# Patient Record
Sex: MALE | Race: WHITE | NOT HISPANIC OR LATINO | Employment: OTHER | URBAN - METROPOLITAN AREA
[De-identification: names, ages, dates, MRNs, and addresses within clinical notes are randomized per-mention and may not be internally consistent; named-entity substitution may affect disease eponyms.]

---

## 2017-02-24 ENCOUNTER — DOCUMENTATION ONLY (OUTPATIENT)
Dept: RESEARCH | Facility: HOSPITAL | Age: 47
End: 2017-02-24

## 2017-04-18 ENCOUNTER — TELEPHONE (OUTPATIENT)
Dept: CARDIOTHORACIC SURGERY | Facility: CLINIC | Age: 47
End: 2017-04-18

## 2017-04-18 DIAGNOSIS — C34.91 SQUAMOUS CELL CARCINOMA OF LUNG, RIGHT: Primary | ICD-10-CM

## 2017-04-18 NOTE — TELEPHONE ENCOUNTER
Called Dr. Crisostomo's office [phone], to speak with Devin.  Requested most recent clinic note and any radiological testing results obtained in the last 4 months be faxed to [phone].

## 2017-04-28 ENCOUNTER — TELEPHONE (OUTPATIENT)
Dept: CARDIOTHORACIC SURGERY | Facility: CLINIC | Age: 47
End: 2017-04-28

## 2017-04-28 NOTE — TELEPHONE ENCOUNTER
Called patient to ele[place] his appt with dr kruger patient states he does not want to [place] because the drive is too far and  he will f/u with his doctors close to his home.  He also stats he will get a cct done after chemo with dr. hernandez and we can call her for the resultes.

## 2017-08-23 ENCOUNTER — TELEPHONE (OUTPATIENT)
Dept: CARDIOTHORACIC SURGERY | Facility: CLINIC | Age: 47
End: 2017-08-23

## 2017-08-23 NOTE — TELEPHONE ENCOUNTER
Returned call concerning scheduling appointment with Dr. Harding. Would like Dr. Harding to refer him to a Neurologist in [place] or [place], to address the numbness to hands and right leg,  Informed by pt that some of the neurological symptoms he is experiencing was present before the surgery of 12/13/2016.  Encouraged to keep Neurologist appointments that he has and to continue with scheduled rehabilitation for leg and hand strength.  Also encouraged to contact his primary Dr. Dowell for any additional pain medication needed.  Reviewed recent notes from Oncologist (CT report 8/14/17) with pt and confirmed that no disease is present s/p radiation and chemo treatment.  Verbalized understanding.

## 2017-09-01 ENCOUNTER — TELEPHONE (OUTPATIENT)
Dept: CARDIOTHORACIC SURGERY | Facility: CLINIC | Age: 47
End: 2017-09-01

## 2017-09-01 NOTE — TELEPHONE ENCOUNTER
Received a call on 8/31 from pt concerning continued pain he is experiencing since surgery in December 2016.  Post op pain is managed by pain management in [place], [place].  Also being followed by primary care physician who has recommended physical therapy that he currently is following. Requesting a referral to another pain management physician in [place] or additional pain medications.  Will discuss with staff.

## 2017-11-24 ENCOUNTER — TELEPHONE (OUTPATIENT)
Dept: CARDIOTHORACIC SURGERY | Facility: CLINIC | Age: 47
End: 2017-11-24

## 2017-11-24 NOTE — TELEPHONE ENCOUNTER
Pt called to confirm previous surgery date (12/13/2016) and discharge date 12/22/2016.  Information confirmed.

## 2017-12-21 ENCOUNTER — TELEPHONE (OUTPATIENT)
Dept: CARDIOTHORACIC SURGERY | Facility: CLINIC | Age: 47
End: 2017-12-21

## 2017-12-21 NOTE — TELEPHONE ENCOUNTER
Received call from pt. Requesting assistance with obtaining a hospital bed in preparation for his neurosurgery in March.  Informed that he should obtain that information and request from his primary sureon or neuro-surgeon.  Verbalized understanding.

## 2018-06-13 ENCOUNTER — TELEPHONE (OUTPATIENT)
Dept: NEUROSURGERY | Facility: CLINIC | Age: 48
End: 2018-06-13

## 2018-06-15 ENCOUNTER — TELEPHONE (OUTPATIENT)
Dept: NEUROSURGERY | Facility: CLINIC | Age: 48
End: 2018-06-15

## 2018-06-15 NOTE — TELEPHONE ENCOUNTER
Pt is trying to get an appt to Neurosurgery Dept. I advised the Pt to request for a referral for our dept. for a 2nd opinion . For he is under another surgeon's care which ended in discussing surgery. The patient has no updated imaging as well, the patient stated that he will go to the current provider and request for a ref. on Tuesday 06/12/18 .

## 2018-06-20 ENCOUNTER — TELEPHONE (OUTPATIENT)
Dept: NEUROSURGERY | Facility: CLINIC | Age: 48
End: 2018-06-20

## 2018-07-30 ENCOUNTER — TELEPHONE (OUTPATIENT)
Dept: NEUROSURGERY | Facility: CLINIC | Age: 48
End: 2018-07-30

## 2018-07-30 NOTE — TELEPHONE ENCOUNTER
Attempted to contact pt to make sure he has hard copy of outside MRI to bring to appt on 8/2. No answer, no voicemail.

## 2018-07-31 NOTE — TELEPHONE ENCOUNTER
pt aware of his appt with neurosurgery on 8/2 and v/u that he needs to bring hard copy of his outside imaging

## 2018-08-02 ENCOUNTER — OFFICE VISIT (OUTPATIENT)
Dept: NEUROSURGERY | Facility: CLINIC | Age: 48
End: 2018-08-02
Payer: MEDICAID

## 2018-08-02 ENCOUNTER — TELEPHONE (OUTPATIENT)
Dept: NEUROSURGERY | Facility: CLINIC | Age: 48
End: 2018-08-02

## 2018-08-02 ENCOUNTER — HOSPITAL ENCOUNTER (OUTPATIENT)
Dept: RADIOLOGY | Facility: HOSPITAL | Age: 48
Discharge: HOME | End: 2018-08-02
Attending: PHYSICIAN ASSISTANT
Payer: MEDICAID

## 2018-08-02 VITALS
HEART RATE: 80 BPM | DIASTOLIC BLOOD PRESSURE: 95 MMHG | WEIGHT: 197.75 LBS | SYSTOLIC BLOOD PRESSURE: 133 MMHG | BODY MASS INDEX: 30.97 KG/M2 | TEMPERATURE: 98 F

## 2018-08-02 DIAGNOSIS — M54.50 LUMBAR BACK PAIN: ICD-10-CM

## 2018-08-02 DIAGNOSIS — M48.04 THORACIC SPINAL STENOSIS: ICD-10-CM

## 2018-08-02 DIAGNOSIS — M54.16 LUMBAR RADICULOPATHY: ICD-10-CM

## 2018-08-02 DIAGNOSIS — M48.02 CERVICAL STENOSIS OF SPINE: Primary | ICD-10-CM

## 2018-08-02 DIAGNOSIS — M54.12 CERVICAL RADICULOPATHY: Primary | ICD-10-CM

## 2018-08-02 DIAGNOSIS — M54.2 NECK PAIN: ICD-10-CM

## 2018-08-02 DIAGNOSIS — M54.50 LUMBAR BACK PAIN: Primary | ICD-10-CM

## 2018-08-02 PROCEDURE — 99213 OFFICE O/P EST LOW 20 MIN: CPT | Mod: PBBFAC,25 | Performed by: PHYSICIAN ASSISTANT

## 2018-08-02 PROCEDURE — 72100 X-RAY EXAM L-S SPINE 2/3 VWS: CPT | Mod: TC

## 2018-08-02 PROCEDURE — 99999 PR PBB SHADOW E&M-EST. PATIENT-LVL III: CPT | Mod: PBBFAC,,, | Performed by: PHYSICIAN ASSISTANT

## 2018-08-02 PROCEDURE — 99204 OFFICE O/P NEW MOD 45 MIN: CPT | Mod: S$PBB,,, | Performed by: PHYSICIAN ASSISTANT

## 2018-08-02 PROCEDURE — 72100 X-RAY EXAM L-S SPINE 2/3 VWS: CPT | Mod: 26,,, | Performed by: RADIOLOGY

## 2018-08-02 RX ORDER — HYDROCODONE BITARTRATE AND ACETAMINOPHEN 10; 325 MG/1; MG/1
TABLET ORAL
COMMUNITY
Start: 2018-07-21

## 2018-08-02 NOTE — PROGRESS NOTES
[facility]  Neurosurgery    SUBJECTIVE:     History of Present Illness:  Patient is a 47 y.o. male with PMHx squamous cell lung cancer s/p resection, radiation, & chemo who presents for evaluation of neck & back pain with RLE weakness.  He reports thoracotomy in 2016 and feels his symptoms began around that time.  He is a very poor historian.  Today, he reports BUE paresthesias in a C8 pattern, with the LUE>RUE.  He denies BUE weakness.  He also reports paresthesias from the chest down to the feet.  He reports RLE weakness that has also been since about 2016.  He denies LLE weakness or B/B dysfunction.  He uses a walker to ambulate due to his RLE weakness.  He has had PT in the past.  He has never had HALLIE.    Review of patient's allergies indicates:  No Known Allergies    Past Medical History:   Diagnosis Date    Atrial fibrillation with rapid ventricular response 12/13/2016    Cancer     right lung    History of cancer chemotherapy     Lumbar back pain     Neuralgia     finger tips/agnieszka. legs    Pneumonia 11/2016    SOB (shortness of breath) on exertion      Past Surgical History:   Procedure Laterality Date    [facility]    LUNG LOBECTOMY Right     PORTACATH PLACEMENT Left      Family History   Problem Relation Age of Onset    Anesthesia problems Neg Hx      Social History   Substance Use Topics    Smoking status: Former Smoker     Packs/day: 1.50     Years: 27.00     Types: Cigarettes    Smokeless tobacco: Not on file      Comment: quit 2012    Alcohol use No        Review of Systems:  Constitutional: no fever or chills  Eyes: no visual changes  ENT: no nasal congestion or sore throat  Respiratory: no cough or shortness of breath  Cardiovascular: no chest pain or palpitations  Gastrointestinal: no nausea or vomiting, tolerating diet  Genitourinary: no hematuria or dysuria  Integument/Breast: no rash or pruritis  Hematologic/Lymphatic: no easy bruising or  lymphadenopathy  Musculoskeletal: positive for neck & back pain  Neurological: no seizures or tremors, positive for leg weakness  Behavioral/Psych: no auditory or visual hallucinations  Endocrine: no heat or cold intolerance    OBJECTIVE:     Vital Signs (Most Recent):  Temp: 97.8 °F (36.6 °C) (08/02/18 0947)  Pulse: 80 (08/02/18 0947)  BP: (!) 133/95 (08/02/18 0947)    Physical Exam:  General: well developed, well nourished, no distress  Head: normocephalic, atraumatic  Neurologic: Alert and oriented. Thought content appropriate  GCS: Motor: 6/Verbal: 5/Eyes: 4 GCS Total: 15  Mental Status: Awake, Alert, Oriented x 4  Language: No aphasia  Speech: No dysarthria  Cranial nerves: face symmetric, tongue midline, CN II-XII grossly intact.   Eyes: pupils equal, round, reactive to light with accommodation, EOMI. Unable to appreciate optic disc. Red reflex intact bilaterally.   Pulmonary: normal respirations, not labored, no accessory muscles used  Abdomen: soft, non-distended, not tender to palpation  Sensory: decreased sensation from the chest, T4 level, and down  Motor Strength: Moves all extremities spontaneously with good tone.  Full strength upper and lower extremities. No abnormal movements seen.     Strength  Deltoids Triceps Biceps Wrist Extension Wrist Flexion Hand    Upper: R 5/5 5/5 5/5 5/5 5/5 5/5    L 5/5 5/5 5/5 5/5 5/5 5/5     Iliopsoas Quadriceps Knee  Flexion Tibialis  anterior Gastro- cnemius EHL   Lower: R 4-/5 4-/5 4-/5 3/5 3/5 3/5    L 5/5 5/5 5/5 5/5 5/5 5/5     DTR's - 2 + and symmetric triceps, biceps, brachioradialis, patellar, & achilles  Pronator Drift: no drift noted  Finger-to-nose: Intact bilaterally  Daniel: absent  Clonus: present on left  Babinski: absent  Pulses: 2+ and symmetric radial and dorsalis pedis  Skin: warm, dry and intact, no rashes  Straight leg raise: negative  Gait: antalgic gait Tandem Gait: Difficulty        Unable to walk on heels & toes on right  Cervical ROM:  full  Lumbar ROM: full   SI Joint tenderness: Negative   Pain on Hip ROM: Negative  Skin: intact, no visible rashes or lesions      Diagnostic Results:  I personally reviewed MRI C/Tspine & agree with the findings: Spondylosis of the cervical spine most prominent at C5-6 with severe stenosis & cord indentation with chronic myelomalacia of the cord.  Spondylosis at T3-4 with stenosis, midline indentation, and chronic myelomalacia of the cord.  Postpneumonectomy changes of the right.  No evidence of aggressive osseous lesion.      ASSESSMENT/PLAN:     Patient is a 47 y.o. male with PMHx squamous cell lung cancer s/p resection, radiation, & chemo who presents for evaluation of neck & back pain with RLE weakness, found to have cervical stenosis at C5-6, as well as some thoracic stenosis at T3-4  -Will get MRI Lspine to further evaluate RLE weakness  -Flex/ex of Cspine ordered to r/o instability  -Follow up with Dr. Herrera after above complete  -Discussed with Dr. Herrera    Please feel free to call with any further questions    Nadege Orantes PA-C  Neurosurgery  [phone]

## 2018-09-27 ENCOUNTER — HOSPITAL ENCOUNTER (OUTPATIENT)
Dept: RADIOLOGY | Facility: HOSPITAL | Age: 48
Discharge: HOME | End: 2018-09-27
Attending: PHYSICIAN ASSISTANT
Payer: MEDICAID

## 2018-09-27 ENCOUNTER — OFFICE VISIT (OUTPATIENT)
Dept: NEUROSURGERY | Facility: CLINIC | Age: 48
End: 2018-09-27
Payer: MEDICAID

## 2018-09-27 VITALS
WEIGHT: 197 LBS | BODY MASS INDEX: 30.85 KG/M2 | HEART RATE: 85 BPM | DIASTOLIC BLOOD PRESSURE: 83 MMHG | TEMPERATURE: 98 F | SYSTOLIC BLOOD PRESSURE: 120 MMHG

## 2018-09-27 DIAGNOSIS — G95.9 CERVICAL MYELOPATHY: Primary | ICD-10-CM

## 2018-09-27 DIAGNOSIS — R29.898 RIGHT LEG WEAKNESS: ICD-10-CM

## 2018-09-27 DIAGNOSIS — M54.2 NECK PAIN: ICD-10-CM

## 2018-09-27 DIAGNOSIS — M54.12 CERVICAL RADICULOPATHY: ICD-10-CM

## 2018-09-27 DIAGNOSIS — M54.50 LUMBAR BACK PAIN: ICD-10-CM

## 2018-09-27 DIAGNOSIS — M54.16 LUMBAR RADICULOPATHY: ICD-10-CM

## 2018-09-27 PROCEDURE — 99999 PR PBB SHADOW E&M-EST. PATIENT-LVL III: CPT | Mod: PBBFAC,,, | Performed by: NEUROLOGICAL SURGERY

## 2018-09-27 PROCEDURE — 72040 X-RAY EXAM NECK SPINE 2-3 VW: CPT | Mod: TC

## 2018-09-27 PROCEDURE — 72148 MRI LUMBAR SPINE W/O DYE: CPT | Mod: 26,,, | Performed by: RADIOLOGY

## 2018-09-27 PROCEDURE — 99214 OFFICE O/P EST MOD 30 MIN: CPT | Mod: S$PBB,,, | Performed by: NEUROLOGICAL SURGERY

## 2018-09-27 PROCEDURE — 99213 OFFICE O/P EST LOW 20 MIN: CPT | Mod: PBBFAC,25 | Performed by: NEUROLOGICAL SURGERY

## 2018-09-27 PROCEDURE — 72148 MRI LUMBAR SPINE W/O DYE: CPT | Mod: TC

## 2018-09-27 PROCEDURE — 72040 X-RAY EXAM NECK SPINE 2-3 VW: CPT | Mod: 26,,, | Performed by: RADIOLOGY

## 2018-09-27 RX ORDER — PREDNISONE 10 MG/1
TABLET ORAL
COMMUNITY
Start: 2018-09-10 | End: 2019-01-03

## 2018-09-27 RX ORDER — ALBUTEROL SULFATE 90 UG/1
AEROSOL, METERED RESPIRATORY (INHALATION)
COMMUNITY
Start: 2018-09-10 | End: 2019-01-03

## 2018-09-27 RX ORDER — VALACYCLOVIR HYDROCHLORIDE 1 G/1
TABLET, FILM COATED ORAL
COMMUNITY
Start: 2018-08-20 | End: 2019-01-03

## 2018-09-27 RX ORDER — OMEPRAZOLE 40 MG/1
CAPSULE, DELAYED RELEASE ORAL
COMMUNITY
Start: 2018-09-17

## 2018-09-27 RX ORDER — FLUCONAZOLE 200 MG/1
TABLET ORAL
COMMUNITY
Start: 2018-08-10 | End: 2019-01-03

## 2018-09-27 RX ORDER — GUAIFENESIN 600 MG/1
TABLET, EXTENDED RELEASE ORAL
COMMUNITY
Start: 2018-08-07

## 2018-09-27 RX ORDER — PROMETHAZINE HYDROCHLORIDE AND CODEINE PHOSPHATE 6.25; 1 MG/5ML; MG/5ML
SOLUTION ORAL
COMMUNITY
Start: 2018-09-10 | End: 2019-01-03

## 2018-09-27 RX ORDER — ACETAMINOPHEN AND CODEINE PHOSPHATE 300; 30 MG/1; MG/1
1 TABLET ORAL
Refills: 0 | COMMUNITY
Start: 2018-09-25 | End: 2019-01-03

## 2018-09-27 RX ORDER — AMOXICILLIN 500 MG/1
CAPSULE ORAL
Refills: 0 | COMMUNITY
Start: 2018-09-25 | End: 2019-01-03

## 2018-09-27 NOTE — PROGRESS NOTES
Dear Nadege Orantes PA-C,     Thank you for referring this patient to my clinic. The full details of my evaluation will also be forthcoming to you by letter.    CHIEF COMPLAINT:  Consult for neck and back pain    I, Mahamed Abraham, attest that this documentation has been prepared under the direction and in the presence of Nilesh Herrera MD.    HPI:  Faye Jordan is a 47 y.o.  male with history of squamous cell lung cancer s/p resection and chemotherapy XRT, and atrial fibrillation, who is referred to me by Nadege Orantes PA-C for evaluation of neck and back pain. Pt reports neck and low back pain as well as RLE weakness. He is primarily concerned with his cervical problems. He notes worsening numbness and tingling in the ulnar aspect of the bilateral forearms into his 4th and 5th digits. He notes waking up from his thoracotomy for lung cancer resection with BLE weakness, right worse than left. He has used a walker since this surgery. Pt received an EMG of the lower extremities but it was a while ago. Pt states that he is in remission and receives yearly exams. Pt presents today using a walker.       Review of patient's allergies indicates:  No Known Allergies    Past Medical History:   Diagnosis Date    Atrial fibrillation with rapid ventricular response 12/13/2016    Cancer     right lung    History of cancer chemotherapy     Lumbar back pain     Neuralgia     finger tips/agnieszka. legs    Pneumonia 11/2016    SOB (shortness of breath) on exertion      Past Surgical History:   Procedure Laterality Date    BRONCHOSCOPY-OPERATIVE,FLEXIBLE N/A 12/13/2016    [facility] 2ND FLR    DISSECTION-LYMPH NODE Right 12/13/2016    [facility]    [facility]    LUNG LOBECTOMY Right     PNEUMONECTOMY Right 12/13/2016    [facility]    PORTACATH PLACEMENT Left      THORACOTOMY-POSTEROLATERAL Right 12/13/2016    [facility] at [facility] OR [address] [place] [address]     Family History   Problem Relation Age of Onset    Anesthesia problems Neg Hx      Social History     Tobacco Use    Smoking status: Former Smoker     Packs/day: 1.50     Years: 27.00     Pack years: 40.50     Types: Cigarettes    Smokeless tobacco: Former User    Tobacco comment: quit 2012   Substance Use Topics    Alcohol use: No    Drug use: No        Review of Systems   HENT: Negative.    Eyes: Negative.    Respiratory: Negative.    Cardiovascular: Negative.    Gastrointestinal: Negative.    Endocrine: Negative.    Genitourinary: Negative.    Musculoskeletal: Positive for back pain, gait problem (walker) and neck pain. Myalgias: Low back pain.   Skin: Negative.    Allergic/Immunologic: Negative.    Neurological: Positive for weakness (BLE, R>L) and numbness (RLE; bilateral forearms). Negative for light-headedness and headaches.   Hematological: Negative.    Psychiatric/Behavioral: Negative.        OBJECTIVE:   Vital Signs:  Temp: 97.9 °F (36.6 °C) (09/27/18 1342)  Pulse: 85 (09/27/18 1342)  BP: 120/83 (09/27/18 1342)    Physical Exam:    Vital signs: All nursing notes and vital signs reviewed -- afebrile, vital signs stable.  Constitutional: Patient sitting comfortably in chair. Appears well developed and well nourished.  Skin: Exposed areas are intact without abnormal markings, rashes or other lesions.  HEENT: Normocephalic. Normal conjunctivae.  Cardiovascular: Normal rate and regular rhythm.  Respiratory: Chest wall rises and falls symmetrically, without signs of respiratory distress.  Abdomen: Soft and non-tender.  Extremities: Warm and without edema. Calves supple, non-tender.  Psych/Behavior: Normal affect.    Neurological:    Mental status: Alert and oriented. Conversational and appropriate.       Cranial Nerves: Grossly intact.     Motor:    Upper:  Deltoids Triceps Biceps WE WF     R 5/5  5/5 5/5 5/5 5/5 4+/5    L 5/5 5/5 5/5 5/5 5/5 5/5      Lower:  HF KE KF DF PF EHL    R 4+/5 4+/5 5/5 4/5 4+/5 5/5    L 5/5 5/5 5/5 5/5 5/5 5/5     Hand intrisics: R (4/5); L(4+/5)    Sensory: Intact sensation to light touch in all extremities. Romberg negative.    Reflexes:          DTR: 2+ symmetrically throughout.     Daniel's: Negative.     Babinski's: Negative.     Clonus: Negative.     Gowers': Positive.    Cerebellar: Finger-to-nose and rapid alternating movements normal. Ataxic gait.    Spine:    Posture: Head well aligned over pelvis in front and side views.  No focal or global spinal deformity visible on inspection. Shoulders and hips even. No obvious leg length discrepancy. No scapula winging.    Bending: Full ROM with forward, back and lateral bending. No rib prominence with forward bend.    Cervical:      ROM: Full with flexion, extension, lateral rotation and ear-to-shoulder bend. Pain with ROM in all directions     Midline TTP: Negative.     Spurling's test: Negative.     Lhermitte's: Negative.    Thoracic:     Midline TTP: Positive, upper thoracic    Lumbar:     Midline TTP: Negative     Straight Leg Test: Negative     Crossed Straight Leg Test: Negative     Sciatic notch tenderness: Negative.    Other:     SI joint TTP: Negative.     Greater trochanter TTP: Negative.     Tenderness with external/internal hip rotation: Negative.    Diagnostic Results:  All imaging was independently reviewed by me.    MRI C-spine, dated 11/29/2017:  1. Central disc bulge at C5/6 with moderate/severe central stenosis and T2 signal change    MRI T-spine, dated 11/29/2017:  1. Central disc bulge at T3/4 with mild central stenosis    MRI L-spine, dated 09/27/2018:  1. No significant stenosis     Flex/Ex X-ray C-spine, dated 09/27/2018:  1. No gross instability  2. Radiology note of lucency at C6 superior endplate    Flex/Ex X-ray L-spine, dated 8/2/2018:  1. No instability    ASSESSMENT/PLAN:     Faye Jordan has a  history of lung CA in remission who presents with chronic neck pain, bilateral C8 distribution numbness, and chronic RLE weakness. He has a disc bulge at C5-6 with moderate central stenosis and myelomalacia which could cause RLE weakness only, but would be atypical and thus I am not convinced. There is also not significant C7-T1 stenosis to explain his BUE numbness, and my suspicion is higher for ulnar neuropathy. There was a C6 endplate lucency noted on his cervical xray, and given his cancer history I would like to get an MRI C spine +/- ysabel. I will add EMG of BUE/BLE to assess for ulnar neuropathy and causes of his RLE weakness. Finally, there is a T3 disc bulge but I do not think the stenosis is significant enough to be the source of his leg weakness. He does have some TTP in the T3 region, so I will evaluate this again more fully once the above workup is complete.    The patient understands and agrees with the plan of care. All questions were answered.     1. MRI C-spine W/WO contrast   2. EMG/Nerve conduction study of UE and LE  3. RTC pending #1      I, Dr. Nilesh Herrera personally performed the services described in this documentation. All medical record entries made by the scribe, Mahamed Abraham, were at my direction and in my presence.  I have reviewed the chart and agree that the record reflects my personal performance and is accurate and complete.      Nilesh Herrera M.D.  Department of Neurosurgery  [facility]      .

## 2018-09-27 NOTE — LETTER
September 27, 2018      Nadege Orantes PA-C  [address] [facility] [address]           [facility] 7th Fl  [address]  [facility] [address]  Phone: [phone]          Patient: Faye Jordan   MR Number: [id number]   YOB: 1970   Date of Visit: 9/27/2018       Dear Nadege Orantes:    Thank you for referring Faye Jordan to me for evaluation. Attached you will find relevant portions of my assessment and plan of care.    If you have questions, please do not hesitate to call me. I look forward to following Faye Jordan along with you.    Sincerely,    Nilesh Herrera MD    Enclosure  CC:  No Recipients    If you would like to receive this communication electronically, please contact [email or web address] or [phone] to request more information on [employer] access.    For providers and/or their staff who would like to refer a patient to [facility], please contact us through our one-stop-shop provider referral line, [facility], at [phone].    If you feel you have received this communication in error or would no longer like to receive these types of communications, please e-mail [email or web address]

## 2018-09-28 ENCOUNTER — TELEPHONE (OUTPATIENT)
Dept: NEUROSURGERY | Facility: CLINIC | Age: 48
End: 2018-09-28

## 2018-09-28 NOTE — TELEPHONE ENCOUNTER
Spoke w/pt about F/U and MRI scheduled times. Informed that EMG will contact him and if he does not hear within the week, to contact them.    Pt is satisfied and understood.

## 2018-10-16 ENCOUNTER — HOSPITAL ENCOUNTER (OUTPATIENT)
Dept: RADIOLOGY | Facility: HOSPITAL | Age: 48
Discharge: HOME | End: 2018-10-16
Attending: NEUROLOGICAL SURGERY
Payer: MEDICAID

## 2018-10-16 DIAGNOSIS — G95.9 CERVICAL MYELOPATHY: ICD-10-CM

## 2018-10-16 DIAGNOSIS — R29.898 RIGHT LEG WEAKNESS: ICD-10-CM

## 2018-10-16 PROCEDURE — 72156 MRI NECK SPINE W/O & W/DYE: CPT | Mod: 26,,, | Performed by: RADIOLOGY

## 2018-10-16 PROCEDURE — A9585 GADOBUTROL INJECTION: HCPCS | Performed by: NEUROLOGICAL SURGERY

## 2018-10-16 PROCEDURE — 25500020 PHARM REV CODE 255: Performed by: NEUROLOGICAL SURGERY

## 2018-10-16 PROCEDURE — 72156 MRI NECK SPINE W/O & W/DYE: CPT | Mod: TC

## 2018-10-16 RX ORDER — GADOBUTROL 604.72 MG/ML
10 INJECTION INTRAVENOUS
Status: COMPLETED | OUTPATIENT
Start: 2018-10-16 | End: 2018-10-16

## 2018-10-16 RX ADMIN — GADOBUTROL 10 ML: 604.72 INJECTION INTRAVENOUS at 01:10

## 2018-11-14 ENCOUNTER — TELEPHONE (OUTPATIENT)
Dept: NEUROSURGERY | Facility: CLINIC | Age: 48
End: 2018-11-14

## 2018-11-14 NOTE — TELEPHONE ENCOUNTER
Referral and last clinic [place] EMG order.

## 2018-12-18 ENCOUNTER — PROCEDURE VISIT (OUTPATIENT)
Dept: NEUROLOGY | Facility: CLINIC | Age: 48
End: 2018-12-18
Payer: MEDICAID

## 2018-12-18 DIAGNOSIS — R29.898 RIGHT LEG WEAKNESS: ICD-10-CM

## 2018-12-18 DIAGNOSIS — G95.9 CERVICAL MYELOPATHY: ICD-10-CM

## 2018-12-18 PROCEDURE — 95913 NRV CNDJ TEST 13/> STUDIES: CPT | Mod: PBBFAC | Performed by: PSYCHIATRY & NEUROLOGY

## 2018-12-18 PROCEDURE — 95886 MUSC TEST DONE W/N TEST COMP: CPT | Mod: PBBFAC | Performed by: PSYCHIATRY & NEUROLOGY

## 2018-12-18 PROCEDURE — 95886 MUSC TEST DONE W/N TEST COMP: CPT | Mod: 26,S$PBB,, | Performed by: PSYCHIATRY & NEUROLOGY

## 2018-12-18 PROCEDURE — 95913 NRV CNDJ TEST 13/> STUDIES: CPT | Mod: 26,S$PBB,, | Performed by: PSYCHIATRY & NEUROLOGY

## 2018-12-24 ENCOUNTER — TELEPHONE (OUTPATIENT)
Dept: NEUROSURGERY | Facility: CLINIC | Age: 48
End: 2018-12-24

## 2018-12-24 NOTE — TELEPHONE ENCOUNTER
"Pt called to aired his grievances about the postal system, the rate at which he receives his mail, the fact that a piece of tape was on the back of the envelope, and that he does not want people opening his mail.    Pt had no health related concerns for the call that are related to our department.    Advised pt to contact the postal service and medical records regarding his issues with the mail system.  Pt later stated he felt like he had "a cyst growing in the back of his throat".  Advised pt to schedule an appt with his PCP as we do not address that type of concern in neurosx.   V/U.     ----- Message from Gayla Vann sent at 12/24/2018  9:27 AM CST -----  Contact: Pt. [phone]  Needs Advice    Reason for call: The patient would like to speak to Dr. Herrera regarding his appointment. He only wants to speak to the doctor.  Please contact the patient to discuss further.          Communication Preference:PHONE     Additional Information:        "

## 2019-01-03 ENCOUNTER — OFFICE VISIT (OUTPATIENT)
Dept: NEUROSURGERY | Facility: CLINIC | Age: 49
End: 2019-01-03
Payer: MEDICAID

## 2019-01-03 VITALS
TEMPERATURE: 98 F | BODY MASS INDEX: 30.37 KG/M2 | HEART RATE: 110 BPM | DIASTOLIC BLOOD PRESSURE: 77 MMHG | WEIGHT: 200.38 LBS | HEIGHT: 68 IN | SYSTOLIC BLOOD PRESSURE: 116 MMHG

## 2019-01-03 DIAGNOSIS — R29.898 WEAKNESS OF RIGHT LOWER EXTREMITY: Primary | ICD-10-CM

## 2019-01-03 PROCEDURE — 99213 OFFICE O/P EST LOW 20 MIN: CPT | Mod: PBBFAC | Performed by: NEUROLOGICAL SURGERY

## 2019-01-03 PROCEDURE — 99999 PR PBB SHADOW E&M-EST. PATIENT-LVL III: CPT | Mod: PBBFAC,,, | Performed by: NEUROLOGICAL SURGERY

## 2019-01-03 PROCEDURE — 99214 OFFICE O/P EST MOD 30 MIN: CPT | Mod: S$PBB,,, | Performed by: NEUROLOGICAL SURGERY

## 2019-01-03 PROCEDURE — 99214 PR OFFICE/OUTPT VISIT, EST, LEVL IV, 30-39 MIN: ICD-10-PCS | Mod: S$PBB,,, | Performed by: NEUROLOGICAL SURGERY

## 2019-01-03 PROCEDURE — 99999 PR PBB SHADOW E&M-EST. PATIENT-LVL III: ICD-10-PCS | Mod: PBBFAC,,, | Performed by: NEUROLOGICAL SURGERY

## 2019-01-03 NOTE — PROGRESS NOTES
CHIEF COMPLAINT:  Follow up after imaging    I, Mahamed Abraham, attest that this documentation has been prepared under the direction and in the presence of Nilesh Herrera MD.    HPI:  Faye Jordan is a 48 y.o.  male with history of squamous cell lung cancer s/p resection and chemotherapy XRT, and atrial fibrillation, who presents today for a follow up evaluation after imaging. Pt reports that his RLE pain has not improved. He also notes paraesthesias in his LUE and ulnar aspect of both hands. He describes the sensation as shocking, numb, and tingling. His RLE pain is primarily in his anterior right ankle. Pt's walking has not worsened. He states that he can walk very short distances but nothing far without his walker.  Pt presents today using a walker.      Review of patient's allergies indicates:  No Known Allergies    Past Medical History:   Diagnosis Date    Atrial fibrillation with rapid ventricular response 12/13/2016    Cancer     right lung    History of cancer chemotherapy     Lumbar back pain     Neuralgia     finger tips/agnieszka. legs    Pneumonia 11/2016    SOB (shortness of breath) on exertion      Past Surgical History:   Procedure Laterality Date    BRONCHOSCOPY-OPERATIVE,FLEXIBLE N/A 12/13/2016    Performed by [facility]ing [facility] at [facility] OR 2ND FLR    DISSECTION-LYMPH NODE Right 12/13/2016    Performed [facility] at [facility]    [facility]    LUNG LOBECTOMY Right     PNEUMONECTOMY Right 12/13/2016    [facility]formed [facility] at [facility]    PORTACATH PLACEMENT Left     THORACOTOMY-POSTEROLATERAL Right 12/13/2016    [facility]     Family History   Problem Relation Age of Onset    Anesthesia problems Neg Hx      Social History     Tobacco Use    Smoking status: Former Smoker     Packs/day: 1.50     Years: 27.00     Pack years: 40.50     Types: Cigarettes    Smokeless tobacco:  Former User    Tobacco comment: quit 2012   Substance Use Topics    Alcohol use: No    Drug use: No        Review of Systems   Constitutional: Negative.    HENT: Negative.    Eyes: Negative.    Respiratory: Negative.    Cardiovascular: Negative.    Gastrointestinal: Negative.    Endocrine: Negative.    Genitourinary: Negative.    Musculoskeletal: Positive for myalgias (Right anterior ankle). Negative for back pain, gait problem and neck pain.   Skin: Negative.    Allergic/Immunologic: Negative.    Neurological: Positive for numbness (ulnar aspect of bilateral hands; LUE). Negative for weakness, light-headedness and headaches.   Hematological: Negative.    Psychiatric/Behavioral: Negative.        OBJECTIVE:   Vital Signs:  Temp: 97.6 °F (36.4 °C) (01/03/19 1438)  Pulse: 110 (01/03/19 1438)  BP: 116/77 (01/03/19 1438)    Physical Exam:    Vital signs: All nursing notes and vital signs reviewed -- afebrile, vital signs stable.  Constitutional: Patient sitting comfortably in chair. Appears well developed and well nourished.  Skin: Exposed areas are intact without abnormal markings, rashes or other lesions.  HEENT: Normocephalic. Normal conjunctivae.  Cardiovascular: Normal rate and regular rhythm.  Respiratory: Chest wall rises and falls symmetrically, without signs of respiratory distress.  Abdomen: Soft and non-tender.  Extremities: Warm and without edema. Calves supple, non-tender.  Psych/Behavior: Normal affect.    Neurological:    Mental status: Alert and oriented. Conversational and appropriate.       Cranial Nerves: Grossly intact.     Motor:    Upper:  Deltoids Triceps Biceps WE WF     R 5/5 5/5 5/5 5/5 5/5 4+/5    L 5/5 5/5 5/5 5/5 5/5 5/5      Lower:  HF KE KF DF PF EHL    R 4+/5 4+/5 5/5 4/5 4+/5 5/5    L 5/5 5/5 5/5 5/5 5/5 5/5     Hand intrisics: R (4/5); L(4+/5)    Gowers': Positive.    Sensory: Intact sensation to light touch in all extremities. Romberg negative.    Reflexes:          DTR: 2+  symmetrically throughout.     Daniel's: Negative.     Babinski's: Negative.     Clonus: Negative.    Cerebellar: Finger-to-nose and rapid alternating movements normal. Gait stable, fluid.    Spine:    Posture: Head well aligned over pelvis in front and side views.  No focal or global spinal deformity visible on inspection. Shoulders and hips even. No obvious leg length discrepancy. No scapula winging.    Bending: Full ROM with forward, back and lateral bending. No rib prominence with forward bend.    Cervical:      ROM: Full with flexion, extension, lateral rotation and ear-to-shoulder bend.      Midline TTP: Negative.     Spurling's test: Negative.     Lhermitte's: Negative.    Thoracic:     Midline TTP: Positive, upper thoracic    Lumbar:     Midline TTP: Negative     Straight Leg Test: Negative     Crossed Straight Leg Test: Negative     Sciatic notch tenderness: Negative.    Other:     SI joint TTP: Negative.     Greater trochanter TTP: Negative.     Tenderness with external/internal hip rotation: Negative.    Diagnostic Results:  All imaging was independently reviewed by me.    EMG/Nerve conduction study, dated 12/18/2018:  1. Left ulnar neuropathy  2. Chronic, bilateral C7 or C8 radiculopathies    MRI C-spine, dated 10/16/2018:  1. No detrimental changes from prior  2. C5/6 disc herniation re-demonstrated with myelomalacia with no concern for metastatic disease at C6 lytic lesions seen on X-ray    ASSESSMENT/PLAN:     Faye Jordan is a 49 y/o male with history of lung cancer in remission following up for bilateral upper extremity paraesthesias and RLE weakness. EMG of bilateral upper extremities shows left ulnar neuropathy and bilateral C7/C8 radiculopathies which does not correlate with the MRI C-spine. We will not proceed with any neurosurgical intervention at C5/6 as it is unclear that he would improve from surgery. We will order an MRI Brain W/WO contrast to identify the source of the pt's RLE weakness.  If the MRI Brain does not show evidence of metastatic disease, we will refer him to Neurology and consider sending him to Dr. Solis for left ulnar neuropathy. Pt can follow up in the [facility] clinic when the imaging is complete.     The patient understands and agrees with the plan of care. All questions were answered.     1. MRI Brain W/WO contrast  2. Referral to Neurology  3. RTC in PA clinic pending #1      I, Dr. Nilesh Herrera personally performed the services described in this documentation. All medical record entries made by the scribe, Mahamed Abraham, were at my direction and in my presence.  I have reviewed the chart and agree that the record reflects my personal performance and is accurate and complete.      Nilesh Herrera M.D.  Department of Neurosurgery  [facility]      .

## 2019-01-07 ENCOUNTER — TELEPHONE (OUTPATIENT)
Dept: NEUROSURGERY | Facility: CLINIC | Age: 49
End: 2019-01-07

## 2019-01-07 NOTE — TELEPHONE ENCOUNTER
Pt has requested a sitting up/standing MRI bc he has a chest cold and a lot of mucus. Informed pt that a lay down MRI will be required in order to acquire the best quality imaging so we can direct him to the appropriate dept for his continued care.  Advised pt to take a cough suppressant prior to his MRI.  V/U.    ----- Message from Quita Wilkerson sent at 1/7/2019  9:57 AM CST -----  Contact: pt @ [phone]  Asking to speak someone in Dr. Herrera' office,regarding his appt. Please call.

## 2019-01-14 ENCOUNTER — TELEPHONE (OUTPATIENT)
Dept: NEUROSURGERY | Facility: CLINIC | Age: 49
End: 2019-01-14

## 2019-01-14 NOTE — TELEPHONE ENCOUNTER
Explained to pt that it was outlined in his PoC that he agreed upon w/Dr. Herrera at his last visit.  V/U. ----- Message from Lit Carlisle sent at 1/14/2019 10:35 AM CST -----  Contact: pateint  Please call pt at [phone]  Patient is confused about why he needs to see a neurologist.     Thank you

## 2019-01-15 ENCOUNTER — TELEPHONE (OUTPATIENT)
Dept: NEUROSURGERY | Facility: CLINIC | Age: 49
End: 2019-01-15

## 2019-01-15 NOTE — TELEPHONE ENCOUNTER
Explained to pt that he needs the MRI done so the neurologist can review it with him.  Told pt he should expect to hear Dr. Fisher's office to schedule his upcoming appt. as I have already sent them a message in Epic to reach out to him.  V/U.  ----- Message from Marcie Stubbs sent at 1/15/2019  1:55 PM CST -----  Contact: self @ [phone]  Pt is calling to see why he has not been schedule a f/u appt for his MRI results.  Pls call.

## 2019-01-18 ENCOUNTER — HOSPITAL ENCOUNTER (OUTPATIENT)
Dept: RADIOLOGY | Facility: HOSPITAL | Age: 49
Discharge: HOME | End: 2019-01-18
Attending: STUDENT IN AN ORGANIZED HEALTH CARE EDUCATION/TRAINING PROGRAM
Payer: MEDICAID

## 2019-01-18 DIAGNOSIS — R29.898 WEAKNESS OF RIGHT LOWER EXTREMITY: ICD-10-CM

## 2019-01-18 PROCEDURE — 70553 MRI BRAIN STEM W/O & W/DYE: CPT | Mod: 26,,, | Performed by: RADIOLOGY

## 2019-01-18 PROCEDURE — 70553 MRI BRAIN W WO CONTRAST: ICD-10-PCS | Mod: 26,,, | Performed by: RADIOLOGY

## 2019-01-18 PROCEDURE — 25500020 PHARM REV CODE 255: Performed by: STUDENT IN AN ORGANIZED HEALTH CARE EDUCATION/TRAINING PROGRAM

## 2019-01-18 PROCEDURE — 70553 MRI BRAIN STEM W/O & W/DYE: CPT | Mod: TC

## 2019-01-18 PROCEDURE — A9585 GADOBUTROL INJECTION: HCPCS | Performed by: STUDENT IN AN ORGANIZED HEALTH CARE EDUCATION/TRAINING PROGRAM

## 2019-01-18 RX ORDER — GADOBUTROL 604.72 MG/ML
10 INJECTION INTRAVENOUS
Status: COMPLETED | OUTPATIENT
Start: 2019-01-18 | End: 2019-01-18

## 2019-01-18 RX ADMIN — GADOBUTROL 10 ML: 604.72 INJECTION INTRAVENOUS at 01:01

## 2019-01-29 ENCOUNTER — OFFICE VISIT (OUTPATIENT)
Dept: NEUROLOGY | Facility: CLINIC | Age: 49
End: 2019-01-29
Payer: MEDICAID

## 2019-01-29 VITALS
SYSTOLIC BLOOD PRESSURE: 117 MMHG | HEART RATE: 102 BPM | WEIGHT: 198.19 LBS | DIASTOLIC BLOOD PRESSURE: 85 MMHG | HEIGHT: 68 IN | BODY MASS INDEX: 30.04 KG/M2

## 2019-01-29 DIAGNOSIS — G95.9 MYELOPATHY: ICD-10-CM

## 2019-01-29 PROCEDURE — 99999 PR PBB SHADOW E&M-EST. PATIENT-LVL III: CPT | Mod: PBBFAC,,, | Performed by: PSYCHIATRY & NEUROLOGY

## 2019-01-29 PROCEDURE — 99205 PR OFFICE/OUTPT VISIT, NEW, LEVL V, 60-74 MIN: ICD-10-PCS | Mod: S$PBB,,, | Performed by: PSYCHIATRY & NEUROLOGY

## 2019-01-29 PROCEDURE — 99213 OFFICE O/P EST LOW 20 MIN: CPT | Mod: PBBFAC | Performed by: PSYCHIATRY & NEUROLOGY

## 2019-01-29 PROCEDURE — 99205 OFFICE O/P NEW HI 60 MIN: CPT | Mod: S$PBB,,, | Performed by: PSYCHIATRY & NEUROLOGY

## 2019-01-29 PROCEDURE — 99999 PR PBB SHADOW E&M-EST. PATIENT-LVL III: ICD-10-PCS | Mod: PBBFAC,,, | Performed by: PSYCHIATRY & NEUROLOGY

## 2019-01-29 RX ORDER — GABAPENTIN 600 MG/1
600 TABLET ORAL 3 TIMES DAILY PRN
Qty: 270 TABLET | Refills: 1 | Status: SHIPPED | OUTPATIENT
Start: 2019-01-29 | End: 2019-01-29 | Stop reason: SDUPTHER

## 2019-01-29 RX ORDER — GABAPENTIN 600 MG/1
600 TABLET ORAL 3 TIMES DAILY PRN
Qty: 270 TABLET | Refills: 1 | Status: SHIPPED | OUTPATIENT
Start: 2019-01-29 | End: 2019-05-04

## 2019-01-29 NOTE — LETTER
January 29, 2019      Nilesh Herrera MD  [address]  [facility] [address]           [facility] - Neurology  [address]  [facility] [address]  Phone: [phone]  Fax: [phone]          Patient: Faye Jordan   MR Number: [id number]   YOB: 1970   Date of Visit: 1/29/2019       Dear Dr. Nilesh Herrera:    Thank you for referring Faye Jordan to me for evaluation. Attached you will find relevant portions of my assessment and plan of care.    If you have questions, please do not hesitate to call me. I look forward to following Faye Jordan along with you.    Sincerely,    Bobby Landeros MD    Enclosure  CC:  No Recipients    If you would like to receive this communication electronically, please contact [email or web address] or [phone] to request more information on [employer] Link access.    For providers and/or their staff who would like to refer a patient to [facility], please contact us through our one-stop-shop provider referral line, [facility], at [phone].    If you feel you have received this communication in error or would no longer like to receive these types of communications, please e-mail [email or web address]

## 2019-01-29 NOTE — Clinical Note
Kimberley, Could you schedule this patient an ASAP clinic appointment with Dr. Herrera?  I briefly discussed the patient with him earlier this week.CV

## 2019-01-30 NOTE — PROGRESS NOTES
[facility] - NEUROLOGY  [facility] [place]    Date: January 30, 2019   Patient Name: Faye Jordan   MRN: [id number]   PCP: Alexis Dowell  Referring Provider: Nilesh Herrera MD    Assessment:      This is Faye Jordan, 48 y.o. male with significant myelopathic signs, C-spine MRI showing cord signal change, and EMG without evidence of ulnar entrapment to explain hand weakness and with changes in activation pattern consistent with central process.     Plan:      -  Follow up appointment arranged with neurosurgery  -  GBP 600mg bid       I discussed side effects of the medications. I asked the patient to stop the medication if he notices serious adverse effects as we discussed and to seek immediate medical attention at an ER.     Bobby Landeros MD  [facility]   Department of Neurology    Subjective:      HPI:   Mr. Faye Jordan is a 48 y.o. male who presents with a chief complaint of difficulty walking    Patient first noted paresthesias throughout his body but primarily L>R arm as well as back and neck pain prior to surgery for lobectomy in 2016 and started using walker shortly after.  Difficulties with gait and weakness in hands have progressed slowly since that time.     PAST MEDICAL HISTORY:  Past Medical History:   Diagnosis Date    Atrial fibrillation with rapid ventricular response 12/13/2016    Cancer     right lung    History of cancer chemotherapy     Lumbar back pain     Neuralgia     finger tips/agnieszka. legs    Pneumonia 11/2016    SOB (shortness of breath) on exertion        PAST SURGICAL HISTORY:  Past Surgical History:   Procedure Laterality Date    BRONCHOSCOPY-OPERATIVE,FLEXIBLE N/A 12/13/2016    [facility] 2ND [facility]LR    DISSECTION-LYMPH NODE Right 12/13/2016    [facility]    [facility]    LUNG LOBECTOMY Right     PNEUMONECTOMY Right 12/13/2016  "   [facility]    PORTACATH PLACEMENT Left     THORACOTOMY-POSTEROLATERAL Right 12/13/2016    [facility] OR [address] [place] [address]       CURRENT MEDS:  Current Outpatient Medications   Medication Sig Dispense Refill    gabapentin (NEURONTIN) 600 MG tablet Take 1 tablet (600 mg total) by mouth 3 (three) times daily as needed. 270 tablet 1    HYDROcodone-acetaminophen (NORCO)  mg per tablet       MUCINEX 600 mg 12 hr tablet       omeprazole (PRILOSEC) 40 MG capsule        No current facility-administered medications for this visit.        ALLERGIES:  Review of patient's allergies indicates:  No Known Allergies    FAMILY HISTORY:  Family History   Problem Relation Age of Onset    Anesthesia problems Neg Hx        SOCIAL HISTORY:  Social History     Tobacco Use    Smoking status: Former Smoker     Packs/day: 1.50     Years: 27.00     Pack years: 40.50     Types: Cigarettes    Smokeless tobacco: Former User    Tobacco comment: quit 2012   Substance Use Topics    Alcohol use: No    Drug use: No       Review of Systems:  12 review of systems is negative except for the symptoms mentioned in HPI.        Objective:     Vitals:    01/29/19 1450   BP: 117/85   Pulse: 102   Weight: 89.9 kg (198 lb 3.1 oz)   Height: 5' 8" (1.727 m)       General: NAD, well nourished   Eyes: no tearing, discharge, no erythema   ENT: moist mucous membranes of the oral cavity, nares patent    Neck: Supple, full range of motion  Cardiovascular: Warm and well perfused, pulses equal and symmetrical  Lungs: Normal work of breathing, normal chest wall excursions  Skin: No rash, lesions, or breakdown on exposed skin  Psychiatry: Mood and affect are appropriate   Abdomen: soft, non tender, non distended  Extremeties: No cyanosis, clubbing or edema.    Neurological   MENTAL STATUS: Alert and oriented to person, place, and time. Attention and concentration within normal limits. " Speech without dysarthria, able to name and repeat without difficulty. Recent and remote memory within normal limits   CRANIAL NERVES: Visual fields intact. PERRL. EOMI. Facial sensation intact. Face symmetrical. Hearing grossly intact. Full shoulder shrug bilaterally. Tongue protrudes midline   SENSORY: Sensation is intact to light touch throughout.    MOTOR: Increased tone in lower extremities.  Arm abduction 4+/5 and hand intrinsics  4/5, bilateral hip flexion and dorsiflexion 4+/5  REFLEXES: 2+ RUE, 1+ LUE, 3+ BLE.   CEREBELLAR/COORDINATION/GAIT: Gait severely spastic and unsteady, requires walker. Finger to nose intact.

## 2019-01-31 ENCOUNTER — TELEPHONE (OUTPATIENT)
Dept: NEUROSURGERY | Facility: CLINIC | Age: 49
End: 2019-01-31

## 2019-02-04 ENCOUNTER — TELEPHONE (OUTPATIENT)
Dept: SPINE | Facility: CLINIC | Age: 49
End: 2019-02-04

## 2019-02-13 NOTE — PROGRESS NOTES
[facility] - Neurosurgery 7th Fl  Neurosurgery  Established Patient    Patient Name: Faye Jordan  MRN: [id number]  Primary Care Provider: Alexis Dowell DO    Subjective:     Chief Complaint/Reason for Admission: FU after EMG    History of Present Illness:   Mr. Faye Jordan is a 48 year old male with PMHx of squamous cell lung cancer s/p resection on 12/13/16, chemotherapy, and XRT, and atrial fibrillation, who was recently seen in clinic by Dr. Herrera on 1/3/19. At that time, he had complaints of RLE pain, LUE paresthesias, weakness, and difficulty walking. MRI of the cervical spine and EMG of the BUE did not explain his weakness so an MRI of the brain was ordered. Patient presents to clinic today to discuss results. He continues to report progressive weakness and increased stiffness/muscle cramps, LE > UE. He is unable to ambulate without assistive devices. He feels unsteady on his feet and his legs feel as if they are going to give out. He has been compliant with the Neurontin which has slightly improved his RLE pain. He continues to report severe BUE pain and paresthesias along the ulnar distribution making it difficult for him to use his hands. These symptoms have not improved with the Neurontin. Denies any bladder/bowel incontinence or symptoms of urinary retention.          (Not in a hospital admission)    Review of patient's allergies indicates:  No Known Allergies    Past Medical History:   Diagnosis Date    Atrial fibrillation with rapid ventricular response 12/13/2016    Cancer     right lung    History of cancer chemotherapy     Lumbar back pain     Neuralgia     finger tips/agnieszka. legs    Pneumonia 11/2016    SOB (shortness of breath) on exertion      Past Surgical History:   Procedure Laterality Date    BRONCHOSCOPY-OPERATIVE,FLEXIBLE N/A 12/13/2016    [facility]    DISSECTION-LYMPH NODE Right 12/13/2016    [facility]  [facility]    [facility]    LUNG LOBECTOMY Right     PNEUMONECTOMY Right 12/13/2016    [facility]    PORTACATH PLACEMENT Left     THORACOTOMY-POSTEROLATERAL Right 12/13/2016    [facility] 2ND [facility]     Family History     None        Tobacco Use    Smoking status: Former Smoker     Packs/day: 1.50     Years: 27.00     Pack years: 40.50     Types: Cigarettes    Smokeless tobacco: Former User    Tobacco comment: quit 2012   Substance and Sexual Activity    Alcohol use: No    Drug use: No    Sexual activity: No     Review of Systems  Objective:       Neurosurgery Physical Exam  General: well developed, well nourished, no distress.   Head: normocephalic, atraumatic  Neurologic: Alert and oriented. Thought content appropriate.  GCS: Motor: 6/Verbal: 5/Eyes: 4 GCS Total: 15  Mental Status: Awake, Alert, Oriented x 4  Language: No aphasia  Speech: No dysarthria  Cranial nerves: face symmetric, tongue midline, CN II-XII grossly intact.   Eyes: pupils equal, round, reactive to light with accomodation, EOMI.   Pulmonary: normal respirations, no signs of respiratory distress  Abdomen: soft, non-distended, not tender to palpation  Sensory: decreased to light touch throughout ulnar distribution bilaterally    Motor: Exam somewhat pain limited  Hand intrisics: R (4/5); L(4+/5)     Upper:   Deltoids Triceps Biceps WE WF      R 5/5 5/5 5/5 5-/5 4+/5 4+/5     L 5/5 5/5 5/5 5/5 5/5 5/5      Lower:   HF KE KF DF PF EHL     R 4-/5 4-/5 4+/5 4/5 4+/5 5/5     L 5/5 5/5 5/5 5/5 5/5 5/5        Daniel: absent  Clonus: sustained clonus bilaterally        Significant Diagnostics:  MRI brain 1/18/19:  I independently reviewed the imaging.     Unremarkable MRI brain as detailed above specifically without evidence for acute infarction or enhancing lesion.         EMG/Nerve conduction study 12/18/18:  1. Left ulnar  neuropathy  2. Chronic, bilateral C7 or C8 radiculopathies         MRI C-spine 10/16/18:  1. No detrimental changes from prior  2. C5/6 disc herniation re-demonstrated with myelomalacia with no concern for metastatic disease at C6 lytic lesions seen on X-ray      Assessment/Plan:     Assessment:  Faye Jordan is a 47 y/o male with history of lung cancer in remission following up for bilateral upper extremity paraesthesias and RLE weakness. EMG of bilateral upper extremities shows left ulnar neuropathy and bilateral C7/C8 radiculopathies.    Plan:  -Patient neurologically stable on exam. Possible progression of weakness vs pain limited exam.   -MRI brain is negative for any metastatic disease, hydrocephalus, hemorrhage, infarct, or other findings that could explain his progressive weakness.  -At this time, no neurosurgical intervention at C5/6 is recommended because it is unclear if he would improve from surgery.  -Referral to Neurology for continued work up of persistent weakness  -Referral to Dr. Solis to discuss treatment options for left ulnar neuropathy  -Return to spine clinic PRN new or worsening symptoms  -Encouraged patient to call the clinic with any questions, concerns, or exam changes prior to follow up appt.       JESS Chu  Neurosurgery  [facility] - Neurosurgery 7th Fl

## 2019-02-14 ENCOUNTER — OFFICE VISIT (OUTPATIENT)
Dept: NEUROSURGERY | Facility: CLINIC | Age: 49
End: 2019-02-14
Payer: MEDICAID

## 2019-02-14 DIAGNOSIS — M50.10 CERVICAL DISC DISORDER WITH RADICULOPATHY: ICD-10-CM

## 2019-02-14 DIAGNOSIS — C34.91 SQUAMOUS CELL CARCINOMA OF RIGHT LUNG: Primary | Chronic | ICD-10-CM

## 2019-02-14 DIAGNOSIS — G89.4 CHRONIC PAIN SYNDROME: ICD-10-CM

## 2019-02-14 DIAGNOSIS — G56.22 ULNAR NEUROPATHY OF LEFT UPPER EXTREMITY: ICD-10-CM

## 2019-02-14 DIAGNOSIS — R53.1 PROGRESSIVE FOCAL MOTOR WEAKNESS: ICD-10-CM

## 2019-02-14 PROCEDURE — 99213 OFFICE O/P EST LOW 20 MIN: CPT | Mod: S$PBB,,, | Performed by: PHYSICIAN ASSISTANT

## 2019-02-14 PROCEDURE — 99999 PR PBB SHADOW E&M-EST. PATIENT-LVL II: CPT | Mod: PBBFAC,,, | Performed by: PHYSICIAN ASSISTANT

## 2019-02-14 PROCEDURE — 99213 PR OFFICE/OUTPT VISIT, EST, LEVL III, 20-29 MIN: ICD-10-PCS | Mod: S$PBB,,, | Performed by: PHYSICIAN ASSISTANT

## 2019-02-14 PROCEDURE — 99212 OFFICE O/P EST SF 10 MIN: CPT | Mod: PBBFAC | Performed by: PHYSICIAN ASSISTANT

## 2019-02-14 PROCEDURE — 99999 PR PBB SHADOW E&M-EST. PATIENT-LVL II: ICD-10-PCS | Mod: PBBFAC,,, | Performed by: PHYSICIAN ASSISTANT

## 2019-02-15 ENCOUNTER — TELEPHONE (OUTPATIENT)
Dept: SPINE | Facility: CLINIC | Age: 49
End: 2019-02-15

## 2019-02-15 PROBLEM — G89.4 CHRONIC PAIN SYNDROME: Status: ACTIVE | Noted: 2019-02-15

## 2019-02-15 PROBLEM — M50.10 CERVICAL DISC DISORDER WITH RADICULOPATHY: Status: ACTIVE | Noted: 2019-02-15

## 2019-02-15 PROBLEM — G56.22 ULNAR NEUROPATHY OF LEFT UPPER EXTREMITY: Status: ACTIVE | Noted: 2019-02-15

## 2019-02-15 NOTE — TELEPHONE ENCOUNTER
----- Message from Nirali Miner RN sent at 2/14/2019  4:57 PM CST -----  April 11  ----- Message -----  From: Kimberley Esteban MA  Sent: 2/14/2019   2:54 PM  To: Nirali Miner RN    Hi Nirali,    Would you please schedule this patient to see Dr. Solis per the referral of Dr. Herrera?  Patient has been worked up and has an EMG.    Informed patient that he will be notified by [facility] once the appointment is set.  Patient prefers phone calls.    Best,  May

## 2019-02-21 ENCOUNTER — TELEPHONE (OUTPATIENT)
Dept: NEUROSURGERY | Facility: CLINIC | Age: 49
End: 2019-02-21

## 2019-02-21 NOTE — TELEPHONE ENCOUNTER
Pt claims Dr. Landeros was in [place] during his scheduled appt on 02.20.  Pt claims Dr. Landeros said the pt needs to return to Dr. Herrera or WILBER Little on Monday at 1300.  Pt has been recently cleared by Dr. Herrera and WILBER Little as non surgical at this time and have referred the pt to Dr. Solis for peripheral nerve issues related to his arms.  Pt has been fully worked up and is cleared by  Dr. Herrera.  Will consult w/Dr. Landeros about his exchange with the pt. since a office note is unavailable. Pt has been provided all of the info/details for his consult w/Dr. Solis on 04.11.2019.  V/U. ----- Message from Aubrey Rios sent at 2/21/2019  8:33 AM CST -----  Needs Advice    Reason for call: Pt states he needs a call back today to schedule an appt w/ either Wanda or Dr. Herrera for Monday at 1 PM. Pt said he came by yesterday and spoke w/ someone regarding this however he does not know the name of the person he spoke with. Pt said he needs a call back before 12 PM.        Communication Preference: [phone]    Additional Information:

## 2019-02-22 ENCOUNTER — TELEPHONE (OUTPATIENT)
Dept: SPINE | Facility: CLINIC | Age: 49
End: 2019-02-22

## 2019-02-22 ENCOUNTER — TELEPHONE (OUTPATIENT)
Dept: NEUROSURGERY | Facility: CLINIC | Age: 49
End: 2019-02-22

## 2019-02-22 NOTE — TELEPHONE ENCOUNTER
----- Message from Tessy Danielson sent at 2/22/2019 10:19 AM CST -----  Contact: Self  Pt is calling to speak with Staff regarding the appt time.  Pt says he lives two hours away and usually makes appts for noon, or beyond.  Pt says he is scheduled for 10:30 am and would like to push the time back.    He can be reached at [phone].    Thank you.

## 2019-02-26 ENCOUNTER — TELEPHONE (OUTPATIENT)
Dept: SPINE | Facility: CLINIC | Age: 49
End: 2019-02-26

## 2019-02-26 NOTE — TELEPHONE ENCOUNTER
----- Message from Zoraida Charles sent at 2/26/2019  1:56 PM CST -----  Contact: narcisa  Name of Who is Calling: narcisa      What is the request in detail: Patient wanted to let the office know he may need to reschedule his appointment he was diagnostics with cancer but he will know the outcome in a few weeks       Can the clinic reply by [facility]: no      What Number to Call Back if not in [facility]: [phone]

## 2019-02-26 NOTE — TELEPHONE ENCOUNTER
Spoke with patient and he says he just wanted to give update that he was just diagnosed with cancer again and will call back if he needs to cancel his upcoming appointment.

## 2019-02-27 ENCOUNTER — TELEPHONE (OUTPATIENT)
Dept: NEUROSURGERY | Facility: CLINIC | Age: 49
End: 2019-02-27

## 2019-02-27 NOTE — TELEPHONE ENCOUNTER
Pt called to let us know his cancer has returned.  Provided pt with the number to MR to discuss acquiring the appropriate imaging and reports for his MD outside of the [facility] network.  V/U.

## 2019-03-06 ENCOUNTER — TELEPHONE (OUTPATIENT)
Dept: NEUROSURGERY | Facility: CLINIC | Age: 49
End: 2019-03-06

## 2019-03-06 NOTE — TELEPHONE ENCOUNTER
Confirmed w/pt that his most recent MRI was a brain MRI.  V/U. ----- Message from Marcie Stubbs sent at 3/6/2019 11:02 AM CST -----  Contact: self @ [phone]  Pt had a MRI on the Brain on 1-18-19.  He is calling to see if this included his neck.  Pls call.

## 2019-05-03 ENCOUNTER — TELEPHONE (OUTPATIENT)
Dept: NEUROLOGY | Facility: CLINIC | Age: 49
End: 2019-05-03

## 2019-05-03 NOTE — TELEPHONE ENCOUNTER
Spoke with patient stated that the gabapentin is not working stated that he would like an increase or another medication ----- Message from Aubrey Rios sent at 5/3/2019 10:22 AM CDT -----  Needs Advice    Reason for call: Pt is asking to speak w/ the doctor or the nurse about gabapentin (NEURONTIN) 600 MG tablet no longer working. Pt requesting the dosage be increased.        Communication Preference: [phone]    Additional Information:

## 2019-05-04 DIAGNOSIS — R53.1 PROGRESSIVE FOCAL MOTOR WEAKNESS: Primary | ICD-10-CM

## 2019-05-04 DIAGNOSIS — G95.9 MYELOPATHY: ICD-10-CM

## 2019-05-04 RX ORDER — GABAPENTIN 800 MG/1
800 TABLET ORAL 3 TIMES DAILY
Qty: 270 TABLET | Refills: 1 | Status: SHIPPED | OUTPATIENT
Start: 2019-05-04 | End: 2019-09-03 | Stop reason: SDUPTHER

## 2019-07-18 ENCOUNTER — HOSPITAL ENCOUNTER (INPATIENT)
Facility: HOSPITAL | Age: 49
LOS: 5 days | Discharge: HOME HEALTH | DRG: 375 | End: 2019-07-23
Attending: SURGERY | Admitting: SURGERY
Payer: MEDICARE

## 2019-07-18 DIAGNOSIS — K31.5 DUODENAL OBSTRUCTION: Primary | ICD-10-CM

## 2019-07-18 PROCEDURE — 11000001 HC ACUTE MED/SURG PRIVATE ROOM

## 2019-07-19 LAB
ALBUMIN SERPL BCP-MCNC: 3.1 G/DL (ref 3.5–5.2)
ALP SERPL-CCNC: 79 U/L (ref 55–135)
ALT SERPL W/O P-5'-P-CCNC: 39 U/L (ref 10–44)
ANION GAP SERPL CALC-SCNC: 8 MMOL/L (ref 8–16)
AST SERPL-CCNC: 25 U/L (ref 10–40)
BASOPHILS # BLD AUTO: 0.01 K/UL (ref 0–0.2)
BASOPHILS NFR BLD: 0.1 % (ref 0–1.9)
BILIRUB SERPL-MCNC: 0.4 MG/DL (ref 0.1–1)
BUN SERPL-MCNC: 14 MG/DL (ref 6–20)
CALCIUM SERPL-MCNC: 8.7 MG/DL (ref 8.7–10.5)
CHLORIDE SERPL-SCNC: 104 MMOL/L (ref 95–110)
CO2 SERPL-SCNC: 27 MMOL/L (ref 23–29)
CREAT SERPL-MCNC: 0.8 MG/DL (ref 0.5–1.4)
DIFFERENTIAL METHOD: ABNORMAL
EOSINOPHIL # BLD AUTO: 0 K/UL (ref 0–0.5)
EOSINOPHIL NFR BLD: 0 % (ref 0–8)
ERYTHROCYTE [DISTWIDTH] IN BLOOD BY AUTOMATED COUNT: 18.2 % (ref 11.5–14.5)
EST. GFR  (AFRICAN AMERICAN): >60 ML/MIN/1.73 M^2
EST. GFR  (NON AFRICAN AMERICAN): >60 ML/MIN/1.73 M^2
GLUCOSE SERPL-MCNC: 107 MG/DL (ref 70–110)
HCT VFR BLD AUTO: 28.6 % (ref 40–54)
HGB BLD-MCNC: 9.2 G/DL (ref 14–18)
IMM GRANULOCYTES # BLD AUTO: 0.02 K/UL (ref 0–0.04)
IMM GRANULOCYTES NFR BLD AUTO: 0.3 % (ref 0–0.5)
LYMPHOCYTES # BLD AUTO: 0.8 K/UL (ref 1–4.8)
LYMPHOCYTES NFR BLD: 12 % (ref 18–48)
MCH RBC QN AUTO: 28.8 PG (ref 27–31)
MCHC RBC AUTO-ENTMCNC: 32.2 G/DL (ref 32–36)
MCV RBC AUTO: 89 FL (ref 82–98)
MONOCYTES # BLD AUTO: 0.4 K/UL (ref 0.3–1)
MONOCYTES NFR BLD: 5.6 % (ref 4–15)
NEUTROPHILS # BLD AUTO: 5.5 K/UL (ref 1.8–7.7)
NEUTROPHILS NFR BLD: 82 % (ref 38–73)
NRBC BLD-RTO: 0 /100 WBC
PLATELET # BLD AUTO: 88 K/UL (ref 150–350)
PMV BLD AUTO: 10.8 FL (ref 9.2–12.9)
POTASSIUM SERPL-SCNC: 4.2 MMOL/L (ref 3.5–5.1)
PROT SERPL-MCNC: 5.7 G/DL (ref 6–8.4)
RBC # BLD AUTO: 3.2 M/UL (ref 4.6–6.2)
SODIUM SERPL-SCNC: 139 MMOL/L (ref 136–145)
WBC # BLD AUTO: 6.74 K/UL (ref 3.9–12.7)

## 2019-07-19 PROCEDURE — 63600175 PHARM REV CODE 636 W HCPCS: Performed by: STUDENT IN AN ORGANIZED HEALTH CARE EDUCATION/TRAINING PROGRAM

## 2019-07-19 PROCEDURE — 25000003 PHARM REV CODE 250: Performed by: STUDENT IN AN ORGANIZED HEALTH CARE EDUCATION/TRAINING PROGRAM

## 2019-07-19 PROCEDURE — 80053 COMPREHEN METABOLIC PANEL: CPT

## 2019-07-19 PROCEDURE — 36415 COLL VENOUS BLD VENIPUNCTURE: CPT

## 2019-07-19 PROCEDURE — 85025 COMPLETE CBC W/AUTO DIFF WBC: CPT

## 2019-07-19 PROCEDURE — 11000001 HC ACUTE MED/SURG PRIVATE ROOM

## 2019-07-19 RX ORDER — LIDOCAINE HYDROCHLORIDE 10 MG/ML
1 INJECTION, SOLUTION EPIDURAL; INFILTRATION; INTRACAUDAL; PERINEURAL ONCE
Status: DISCONTINUED | OUTPATIENT
Start: 2019-07-19 | End: 2019-07-23

## 2019-07-19 RX ORDER — FENTANYL 25 UG/1
1 PATCH TRANSDERMAL
Status: DISCONTINUED | OUTPATIENT
Start: 2019-07-19 | End: 2019-07-23 | Stop reason: HOSPADM

## 2019-07-19 RX ORDER — ACETAMINOPHEN 325 MG/1
650 TABLET ORAL EVERY 4 HOURS PRN
Status: DISCONTINUED | OUTPATIENT
Start: 2019-07-19 | End: 2019-07-23 | Stop reason: HOSPADM

## 2019-07-19 RX ORDER — OXYCODONE HYDROCHLORIDE 5 MG/1
5 TABLET ORAL EVERY 4 HOURS PRN
Status: DISCONTINUED | OUTPATIENT
Start: 2019-07-19 | End: 2019-07-23

## 2019-07-19 RX ORDER — ACETAMINOPHEN 325 MG/1
650 TABLET ORAL EVERY 8 HOURS PRN
Status: DISCONTINUED | OUTPATIENT
Start: 2019-07-19 | End: 2019-07-19

## 2019-07-19 RX ORDER — SODIUM CHLORIDE 0.9 % (FLUSH) 0.9 %
10 SYRINGE (ML) INJECTION
Status: DISCONTINUED | OUTPATIENT
Start: 2019-07-19 | End: 2019-07-23 | Stop reason: HOSPADM

## 2019-07-19 RX ORDER — PANTOPRAZOLE SODIUM 40 MG/1
40 TABLET, DELAYED RELEASE ORAL DAILY
Status: DISCONTINUED | OUTPATIENT
Start: 2019-07-19 | End: 2019-07-23 | Stop reason: HOSPADM

## 2019-07-19 RX ORDER — SODIUM CHLORIDE 9 MG/ML
INJECTION, SOLUTION INTRAVENOUS CONTINUOUS
Status: DISCONTINUED | OUTPATIENT
Start: 2019-07-19 | End: 2019-07-23

## 2019-07-19 RX ORDER — GABAPENTIN 400 MG/1
800 CAPSULE ORAL 3 TIMES DAILY
Status: DISCONTINUED | OUTPATIENT
Start: 2019-07-19 | End: 2019-07-23 | Stop reason: HOSPADM

## 2019-07-19 RX ORDER — ONDANSETRON 8 MG/1
8 TABLET, ORALLY DISINTEGRATING ORAL EVERY 6 HOURS PRN
Status: DISCONTINUED | OUTPATIENT
Start: 2019-07-19 | End: 2019-07-23 | Stop reason: HOSPADM

## 2019-07-19 RX ORDER — HYDROMORPHONE HYDROCHLORIDE 1 MG/ML
1 INJECTION, SOLUTION INTRAMUSCULAR; INTRAVENOUS; SUBCUTANEOUS EVERY 4 HOURS PRN
Status: DISCONTINUED | OUTPATIENT
Start: 2019-07-19 | End: 2019-07-23

## 2019-07-19 RX ORDER — ACETAMINOPHEN 325 MG/1
650 TABLET ORAL EVERY 4 HOURS PRN
Status: DISCONTINUED | OUTPATIENT
Start: 2019-07-19 | End: 2019-07-19

## 2019-07-19 RX ADMIN — GABAPENTIN 800 MG: 400 CAPSULE ORAL at 08:07

## 2019-07-19 RX ADMIN — HYDROMORPHONE HYDROCHLORIDE 1 MG: 1 INJECTION, SOLUTION INTRAMUSCULAR; INTRAVENOUS; SUBCUTANEOUS at 11:07

## 2019-07-19 RX ADMIN — PANTOPRAZOLE SODIUM 40 MG: 40 TABLET, DELAYED RELEASE ORAL at 08:07

## 2019-07-19 RX ADMIN — HYDROMORPHONE HYDROCHLORIDE 1 MG: 1 INJECTION, SOLUTION INTRAMUSCULAR; INTRAVENOUS; SUBCUTANEOUS at 07:07

## 2019-07-19 RX ADMIN — FENTANYL 1 PATCH: 25 PATCH, EXTENDED RELEASE TRANSDERMAL at 09:07

## 2019-07-19 RX ADMIN — HYDROMORPHONE HYDROCHLORIDE 1 MG: 1 INJECTION, SOLUTION INTRAMUSCULAR; INTRAVENOUS; SUBCUTANEOUS at 08:07

## 2019-07-19 RX ADMIN — HYDROMORPHONE HYDROCHLORIDE 1 MG: 1 INJECTION, SOLUTION INTRAMUSCULAR; INTRAVENOUS; SUBCUTANEOUS at 01:07

## 2019-07-19 RX ADMIN — ONDANSETRON 8 MG: 8 TABLET, ORALLY DISINTEGRATING ORAL at 07:07

## 2019-07-19 RX ADMIN — GABAPENTIN 800 MG: 400 CAPSULE ORAL at 02:07

## 2019-07-19 RX ADMIN — HYDROMORPHONE HYDROCHLORIDE 1 MG: 1 INJECTION, SOLUTION INTRAMUSCULAR; INTRAVENOUS; SUBCUTANEOUS at 03:07

## 2019-07-19 RX ADMIN — PROMETHAZINE HYDROCHLORIDE 6.25 MG: 25 INJECTION INTRAMUSCULAR; INTRAVENOUS at 08:07

## 2019-07-19 RX ADMIN — SODIUM CHLORIDE: 0.9 INJECTION, SOLUTION INTRAVENOUS at 01:07

## 2019-07-19 RX ADMIN — PROMETHAZINE HYDROCHLORIDE 6.25 MG: 25 INJECTION INTRAMUSCULAR; INTRAVENOUS at 02:07

## 2019-07-19 RX ADMIN — ONDANSETRON 8 MG: 8 TABLET, ORALLY DISINTEGRATING ORAL at 11:07

## 2019-07-19 RX ADMIN — SODIUM CHLORIDE: 0.9 INJECTION, SOLUTION INTRAVENOUS at 11:07

## 2019-07-19 NOTE — HPI
Faye Jordan is a 48 y.o. male with PMHx of squamous cell lung cancer s/p resection on 12/13/16, chemotherapy, and XRT, and atrial fibrillation who was transferred from [facility] for higher level of care. He went to the [place] ED on the night of 7/17 with severe epigastric abdominal pain after completing a course of chemo. This pain onset 3 days ago and has occurred intermittently for the past month. The pain is constant and is made worse with eating and movement. The pain is associated with vomiting (multiple episodes yesterday, none today) and diarrhea. He has had a decreased appetite for the past two days secondary to this pain/emesis. In the ED he was found to have obstruction of the distal duodenum due to a pancreatic tail mass, as well as multiple hepatic metastatic lesions and fluid at the right lung base. Upon arrival at [facility], he continues to c/o severe epigastric pain, though he denies nausea/vomiting/diarrhea. He adds that he has lost about 30 pounds in the last 6 weeks.   He was also recently admitted at [facility] from 7/3 to 7/9 for the same symptoms. He was treated medically for the duodenal obstruction with NGT and was later discharged without further intervention.   He denies fever, CP, dyspnea, hematemesis, hematochezia, melena, dysuria.     Initially pT2bN0 stage IIA NSCLC (squamous) s/p right pneumonectomy with + margins followed by adjuvant chemoRT (cis) and adjuvant chemo (carbo/gem).  2/5/19 found to have biopsy proven recurrence (tracheobronchial bx) with PD-L1 of 30%. Bx of liver lesion confirms metastatic disease.

## 2019-07-19 NOTE — SUBJECTIVE & OBJECTIVE
No current facility-administered medications on file prior to encounter.      Current Outpatient Medications on File Prior to Encounter   Medication Sig    gabapentin (NEURONTIN) 800 MG tablet Take 1 tablet (800 mg total) by mouth 3 (three) times daily.    HYDROcodone-acetaminophen (NORCO)  mg per tablet     MUCINEX 600 mg 12 hr tablet     omeprazole (PRILOSEC) 40 MG capsule        Review of patient's allergies indicates:  No Known Allergies    Past Medical History:   Diagnosis Date    Atrial fibrillation with rapid ventricular response 12/13/2016    Cancer     right lung    History of cancer chemotherapy     Lumbar back pain     Neuralgia     finger tips/agnieszka. legs    Pneumonia 11/2016    SOB (shortness of breath) on exertion      Past Surgical History:   Procedure Laterality Date    BRONCHOSCOPY-OPERATIVE,FLEXIBLE N/A 12/13/2016    [facility]    DISSECTION-LYMPH NODE Right 12/13/2016    [facility]    [facility]    LUNG LOBECTOMY Right     PNEUMONECTOMY Right 12/13/2016    [facility]    PORTACATH PLACEMENT Left     THORACOTOMY-POSTEROLATERAL Right 12/13/2016    [facility]     Family History     None        Tobacco Use    Smoking status: Former Smoker     Packs/day: 1.50     Years: 27.00     Pack years: 40.50     Types: Cigarettes    Smokeless tobacco: Former User    Tobacco comment: quit 2012   Substance and Sexual Activity    Alcohol use: No    Drug use: No    Sexual activity: Never     Review of Systems   Constitutional: Positive for appetite change (Decreased) and unexpected weight change (30 lbs in 6 weeks). Negative for fever.   HENT: Negative for congestion, facial swelling and sore throat.    Eyes: Negative for discharge and redness.   Respiratory: Positive for shortness of  breath (chronic s/p R pneumonectomy ). Negative for cough and chest tightness.    Cardiovascular: Negative for chest pain.   Gastrointestinal: Positive for abdominal distention, abdominal pain, diarrhea, nausea and vomiting. Negative for constipation.   Genitourinary: Negative for difficulty urinating, dysuria and hematuria.   Musculoskeletal: Negative for gait problem.   Skin: Negative for color change.   Neurological: Negative for dizziness, light-headedness and headaches.   Psychiatric/Behavioral: Negative for agitation and behavioral problems. The patient is not nervous/anxious.      Objective:     Vital Signs (Most Recent):    Vital Signs (24h Range):  Temp:  [97.5 °F (36.4 °C)] 97.5 °F (36.4 °C)  Pulse:  [85] 85  Resp:  [20] 20  SpO2:  [100 %] 100 %  BP: (122)/(87) 122/87     Weight: 79.4 kg (175 lb)  Body mass index is 26.61 kg/m².    Physical Exam   Constitutional: He is oriented to person, place, and time. He appears well-developed and well-nourished. No distress.   HENT:   Head: Normocephalic and atraumatic.   Mouth/Throat: Oropharynx is clear and moist.   Eyes: Conjunctivae and EOM are normal. Right eye exhibits no discharge. Left eye exhibits no discharge.   Neck: Normal range of motion. Neck supple.   Cardiovascular: Normal rate, regular rhythm and intact distal pulses.   Pulmonary/Chest: Effort normal. No stridor. No respiratory distress. He has no wheezes. He has no rales.   Abdominal: Soft. He exhibits distension. There is tenderness in the epigastric area. There is guarding.   Musculoskeletal: Normal range of motion. He exhibits no deformity.   Neurological: He is alert and oriented to person, place, and time.   Skin: Skin is warm and dry.   Psychiatric: He has a normal mood and affect. His behavior is normal.       Significant Labs:  CBC: No results for input(s): WBC, RBC, HGB, HCT, PLT, MCV, MCH, MCHC in the last 168 hours.  CMP: No results for input(s): GLU, CALCIUM, ALBUMIN, PROT, NA, K, CO2,  CL, BUN, CREATININE, ALKPHOS, ALT, AST, BILITOT in the last 168 hours.    Significant Diagnostics:  I have reviewed all pertinent imaging results/findings within the past 24 hours.

## 2019-07-19 NOTE — HPI
Faye Jordan is a 48 y.o. male with PMHx of squamous cell lung cancer s/p resection on 12/13/16, chemotherapy, and XRT, and atrial fibrillation who was transferred from [facility] for higher level of care. He went to the [place] ED on the night of 7/17 with severe epigastric abdominal pain after completing a course of chemo. This pain onset 3 days ago and has occurred intermittently for the past month. The pain is constant and is made worse with eating and movement. The pain is associated with vomiting (multiple episodes yesterday, none today) and diarrhea. He has had a decreased appetite for the past two days secondary to this pain/emesis. In the ED he was found to have obstruction of the distal duodenum due to a pancreatic tail mass, as well as multiple hepatic metastatic lesions and fluid at the right lung base. Upon arrival at [facility], he continues to c/o severe epigastric pain, though he denies nausea/vomiting/diarrhea. He adds that he has lost about 30 pounds in the last 6 weeks.   He was also recently admitted at [facility] from 7/3 to 7/9 for the same symptoms. He was treated medically for the duodenal obstruction with NGT and was later discharged without further intervention.   He denies fever, CP, dyspnea, hematemesis, hematochezia, melena, dysuria.     Initially pT2bN0 stage IIA NSCLC (squamous) s/p right pneumonectomy with + margins followed by adjuvant chemoRT (cis) and adjuvant chemo (carbo/gem).  2/5/19 found to have biopsy proven recurrence (tracheobronchial bx) with PD-L1 of 30%. Bx of liver lesion confirms metastatic disease.  Received a single dose of opdivo then switched to carbo/abraxane/keytruda for more immediate cytoreduction. He completed 2 full cycles and is currently on cycle 3 with imaging showing relatively stable disease.

## 2019-07-19 NOTE — SUBJECTIVE & OBJECTIVE
Medications Prior to Admission   Medication Sig Dispense Refill Last Dose    gabapentin (NEURONTIN) 800 MG tablet Take 1 tablet (800 mg total) by mouth 3 (three) times daily. 270 tablet 1     HYDROcodone-acetaminophen (NORCO)  mg per tablet    Taking    MUCINEX 600 mg 12 hr tablet    Taking    omeprazole (PRILOSEC) 40 MG capsule    Taking       Review of patient's allergies indicates:  No Known Allergies    Past Medical History:   Diagnosis Date    Atrial fibrillation with rapid ventricular response 12/13/2016    Cancer     right lung    History of cancer chemotherapy     Lumbar back pain     Neuralgia     finger tips/agnieszka. legs    Pneumonia 11/2016    SOB (shortness of breath) on exertion      Past Surgical History:   Procedure Laterality Date    BRONCHOSCOPY-OPERATIVE,FLEXIBLE N/A 12/13/2016    Performed by [facility] at [facility] OR [address] [place] [address]    DISSECTION-LYMPH NODE Right 12/13/2016    [facility]    [facility]    LUNG LOBECTOMY Right     PNEUMONECTOMY Right 12/13/2016    [facility]    PORTACATH PLACEMENT Left     THORACOTOMY-POSTEROLATERAL Right 12/13/2016    [facility]     Family History     None        Tobacco Use    Smoking status: Former Smoker     Packs/day: 1.50     Years: 27.00     Pack years: 40.50     Types: Cigarettes    Smokeless tobacco: Former User    Tobacco comment: quit 2012   Substance and Sexual Activity    Alcohol use: No    Drug use: No    Sexual activity: Never     Review of Systems   Constitutional: Positive for appetite change (Decreased) and unexpected weight change (30 lbs in 6 weeks).   HENT: Negative for congestion, facial swelling and sore throat.    Eyes: Negative for discharge and redness.   Respiratory: Positive for shortness of breath (chronic s/p pneumonectomy). Negative for cough and  chest tightness.    Cardiovascular: Negative for chest pain and palpitations.   Gastrointestinal: Positive for abdominal distention, abdominal pain, diarrhea, nausea and vomiting.   Genitourinary: Negative for difficulty urinating, dysuria and frequency.   Musculoskeletal: Negative for gait problem and neck pain.   Skin: Negative for color change.   Neurological: Negative for dizziness, weakness and light-headedness.   Psychiatric/Behavioral: Negative for behavioral problems. The patient is not nervous/anxious.      Objective:     Vital Signs (Most Recent):    Vital Signs (24h Range):  Temp:  [97.5 °F (36.4 °C)] 97.5 °F (36.4 °C)  Pulse:  [85] 85  Resp:  [20] 20  SpO2:  [100 %] 100 %  BP: (122)/(87) 122/87     Weight: 79.4 kg (175 lb)  Body mass index is 26.61 kg/m².    Physical Exam   Constitutional: He is oriented to person, place, and time. He appears well-developed and well-nourished. No distress.   HENT:   Head: Normocephalic and atraumatic.   Mouth/Throat: Oropharynx is clear and moist.   Eyes: Conjunctivae and EOM are normal. Right eye exhibits no discharge. Left eye exhibits no discharge.   Neck: Normal range of motion. Neck supple.   Cardiovascular: Normal rate, regular rhythm and intact distal pulses.   Pulmonary/Chest: Effort normal. No stridor. No respiratory distress. He has no wheezes. He has no rales.   Absent breath sounds on R   Abdominal: Soft. He exhibits distension. There is tenderness in the epigastric area. There is rebound.   Musculoskeletal: Normal range of motion. He exhibits no deformity.   Neurological: He is alert and oriented to person, place, and time.   Skin: Skin is warm and dry.   Psychiatric: He has a normal mood and affect. His behavior is normal.       Significant Labs:  CBC: No results for input(s): WBC, RBC, HGB, HCT, PLT, MCV, MCH, MCHC in the last 48 hours.  CMP: No results for input(s): GLU, CALCIUM, ALBUMIN, PROT, NA, K, CO2, CL, BUN, CREATININE, ALKPHOS, ALT, AST, BILITOT  in the last 48 hours.    Significant Diagnostics:  I have reviewed all pertinent imaging results/findings within the past 24 hours.

## 2019-07-19 NOTE — ASSESSMENT & PLAN NOTE
Faye Jordan is a 48 y.o. male with PMHx of squamous cell lung cancer s/p resection on 12/13/16, chemotherapy, and XRT, and atrial fibrillation who was transferred from  for higher level of care. His squamous cell lung cancer has biopsy-proven metastases to his liver and he has also developed a mass of the tail of his pancreas that is causing duodenal obstruction.     - Pertinent history and exam findings discussed with staff  - Outside hospital records reviewed  - NPO, mIVF  - Pain meds prn, antiemetics prn  - Consult Heme/Onc regarding his chemotherapeutic regimen  - CBC, CMP pending  - NGT placement if emesis begins  - Further workup and plans to follow

## 2019-07-19 NOTE — H&P
[facility]  General Surgery  History & Physical    Patient Name: Faye Jordan  MRN: [id number]  Admission Date: 7/18/2019  Attending Physician: Roseline Santiago MD   Primary Care Provider: Alexis Dowell DO    Patient information was obtained from patient, past medical records and ER records.     Subjective:     Chief Complaint/Reason for Admission: Duodenal Obstruction    History of Present Illness: Faye Jordan is a 48 y.o. male with PMHx of squamous cell lung cancer s/p resection on 12/13/16, chemotherapy, and XRT, and atrial fibrillation who was transferred from [facility] for higher level of care. He went to the [place] ED on the night of 7/17 with severe epigastric abdominal pain after completing a course of chemo. This pain onset 3 days ago and has occurred intermittently for the past month. The pain is constant and is made worse with eating and movement. The pain is associated with vomiting (multiple episodes yesterday, none today) and diarrhea. He has had a decreased appetite for the past two days secondary to this pain/emesis. In the ED he was found to have obstruction of the distal duodenum due to a pancreatic tail mass, as well as multiple hepatic metastatic lesions and fluid at the right lung base. Upon arrival at [facility], he continues to c/o severe epigastric pain, though he denies nausea/vomiting/diarrhea. He adds that he has lost about 30 pounds in the last 6 weeks.   He was also recently admitted at [facility] from 7/3 to 7/9 for the same symptoms. He was treated medically for the duodenal obstruction with NGT and was later discharged without further intervention.   He denies fever, CP, dyspnea, hematemesis, hematochezia, melena, dysuria.     Initially pT2bN0 stage IIA NSCLC (squamous) s/p right pneumonectomy with + margins followed by adjuvant chemoRT (cis) and adjuvant chemo (carbo/gem).  2/5/19 found to have biopsy proven recurrence (tracheobronchial  bx) with PD-L1 of 30%. Bx of liver lesion confirms metastatic disease.              No current facility-administered medications on file prior to encounter.      Current Outpatient Medications on File Prior to Encounter   Medication Sig    gabapentin (NEURONTIN) 800 MG tablet Take 1 tablet (800 mg total) by mouth 3 (three) times daily.    HYDROcodone-acetaminophen (NORCO)  mg per tablet     MUCINEX 600 mg 12 hr tablet     omeprazole (PRILOSEC) 40 MG capsule        Review of patient's allergies indicates:  No Known Allergies    Past Medical History:   Diagnosis Date    Atrial fibrillation with rapid ventricular response 12/13/2016    Cancer     right lung    History of cancer chemotherapy     Lumbar back pain     Neuralgia     finger tips/agnieszka. legs    Pneumonia 11/2016    SOB (shortness of breath) on exertion      Past Surgical History:   Procedure Laterality Date    BRONCHOSCOPY-OPERATIVE,FLEXIBLE N/A 12/13/2016    [facility]    DISSECTION-LYMPH NODE Right 12/13/2016    [facility]    [facility]UN[facility]  [facility]2016    [facility]    LUNG LOBECTOMY Right     PNEUMONECTOMY Right 12/13/2016    [facility]    PORTACATH PLACEMENT Left     THORACOTOMY-POSTEROLATERAL Right 12/13/2016    [facility]     Family History     None        Tobacco Use    Smoking status: Former Smoker     Packs/day: 1.50     Years: 27.00     Pack years: 40.50     Types: Cigarettes    Smokeless tobacco: Former User    Tobacco comment: quit 2012   Substance and Sexual Activity    Alcohol use: No    Drug use: No    Sexual activity: Never     Review of Systems   Constitutional: Positive for appetite change (Decreased) and unexpected weight change (30 lbs in 6 weeks). Negative for fever.   HENT: Negative for congestion, facial swelling and sore throat.     Eyes: Negative for discharge and redness.   Respiratory: Positive for shortness of breath (chronic s/p R pneumonectomy ). Negative for cough and chest tightness.    Cardiovascular: Negative for chest pain.   Gastrointestinal: Positive for abdominal distention, abdominal pain, diarrhea, nausea and vomiting. Negative for constipation.   Genitourinary: Negative for difficulty urinating, dysuria and hematuria.   Musculoskeletal: Negative for gait problem.   Skin: Negative for color change.   Neurological: Negative for dizziness, light-headedness and headaches.   Psychiatric/Behavioral: Negative for agitation and behavioral problems. The patient is not nervous/anxious.      Objective:     Vital Signs (Most Recent):    Vital Signs (24h Range):  Temp:  [97.5 °F (36.4 °C)] 97.5 °F (36.4 °C)  Pulse:  [85] 85  Resp:  [20] 20  SpO2:  [100 %] 100 %  BP: (122)/(87) 122/87     Weight: 79.4 kg (175 lb)  Body mass index is 26.61 kg/m².    Physical Exam   Constitutional: He is oriented to person, place, and time. He appears well-developed and well-nourished. No distress.   HENT:   Head: Normocephalic and atraumatic.   Mouth/Throat: Oropharynx is clear and moist.   Eyes: Conjunctivae and EOM are normal. Right eye exhibits no discharge. Left eye exhibits no discharge.   Neck: Normal range of motion. Neck supple.   Cardiovascular: Normal rate, regular rhythm and intact distal pulses.   Pulmonary/Chest: Effort normal. No stridor. No respiratory distress. He has no wheezes. He has no rales.   Abdominal: Soft. He exhibits distension. There is tenderness in the epigastric area. There is guarding.   Musculoskeletal: Normal range of motion. He exhibits no deformity.   Neurological: He is alert and oriented to person, place, and time.   Skin: Skin is warm and dry.   Psychiatric: He has a normal mood and affect. His behavior is normal.       Significant Labs:  CBC: No results for input(s): WBC, RBC, HGB, HCT, PLT, MCV, MCH, MCHC in the last  168 hours.  CMP: No results for input(s): GLU, CALCIUM, ALBUMIN, PROT, NA, K, CO2, CL, BUN, CREATININE, ALKPHOS, ALT, AST, BILITOT in the last 168 hours.    Significant Diagnostics:  I have reviewed all pertinent imaging results/findings within the past 24 hours.    Assessment/Plan:     Duodenal obstruction   Faye Jordan is a 48 y.o. male with PMHx of squamous cell lung cancer s/p resection on 12/13/16, chemotherapy, and XRT, and atrial fibrillation who was transferred from [facility] for higher level of care. His squamous cell lung cancer has biopsy-proven metastases to his liver and he has also developed a mass of the tail of his pancreas that is causing duodenal obstruction.     - Pertinent history and exam findings discussed with staff  - Outside hospital records reviewed  - NPO, mIVF  - Pain meds prn, antiemetics prn  - Consult Heme/Onc regarding his chemotherapeutic regimen  - CBC, CMP pending  - NGT placement if emesis begins  - Further workup and plans to follow      VTE Risk Mitigation (From admission, onward)        Ordered     Place sequential compression device  Until discontinued      07/19/19 0026     IP VTE HIGH RISK PATIENT  Once      07/19/19 0026          Jcarlos Vogt MD  General Surgery  [facility]

## 2019-07-19 NOTE — ASSESSMENT & PLAN NOTE
Faye Jordan is a 48 y.o. male with PMHx of squamous cell lung cancer s/p resection on 12/13/16, chemotherapy, and XRT, and atrial fibrillation who was transferred from [facility] for higher level of care. His squamous cell lung cancer has biopsy-proven metastases to his liver and he has also developed a mass of the tail of his pancreas that is causing duodenal obstruction.     - Pertinent history and exam findings discussed with staff  - Outside hospital records and imaging reviewed  - NPO, mIVF  - Pain meds prn, antiemetics prn  - Consult Heme/Onc regarding his chemotherapeutic regimen  - CBC, CMP pending  - NGT placement if emesis begins

## 2019-07-19 NOTE — NURSING
Pt arrived to floor via EMS. No distress noted. Complaint of only pain. Attending service notified.

## 2019-07-19 NOTE — PLAN OF CARE
Problem: Adult Inpatient Plan of Care  Goal: Plan of Care Review  Outcome: Ongoing (interventions implemented as appropriate)  Plan of care reviewed and updated. Pt AA+O. Pt's pain is managed with the medication ordered at this time. Pt's VS are as charted.  No falls this shift. Pt is oriented to room and call system. Will continue to [facility] and the [facility].

## 2019-07-20 PROCEDURE — 99223 1ST HOSP IP/OBS HIGH 75: CPT | Mod: GC,,, | Performed by: INTERNAL MEDICINE

## 2019-07-20 PROCEDURE — 25500020 PHARM REV CODE 255: Performed by: SURGERY

## 2019-07-20 PROCEDURE — 11000001 HC ACUTE MED/SURG PRIVATE ROOM

## 2019-07-20 PROCEDURE — 25000003 PHARM REV CODE 250: Performed by: STUDENT IN AN ORGANIZED HEALTH CARE EDUCATION/TRAINING PROGRAM

## 2019-07-20 PROCEDURE — 63600175 PHARM REV CODE 636 W HCPCS: Performed by: STUDENT IN AN ORGANIZED HEALTH CARE EDUCATION/TRAINING PROGRAM

## 2019-07-20 PROCEDURE — 99223 PR INITIAL HOSPITAL CARE,LEVL III: ICD-10-PCS | Mod: GC,,, | Performed by: INTERNAL MEDICINE

## 2019-07-20 RX ADMIN — GABAPENTIN 800 MG: 400 CAPSULE ORAL at 08:07

## 2019-07-20 RX ADMIN — ONDANSETRON 8 MG: 8 TABLET, ORALLY DISINTEGRATING ORAL at 02:07

## 2019-07-20 RX ADMIN — GABAPENTIN 800 MG: 400 CAPSULE ORAL at 02:07

## 2019-07-20 RX ADMIN — HYDROMORPHONE HYDROCHLORIDE 1 MG: 1 INJECTION, SOLUTION INTRAMUSCULAR; INTRAVENOUS; SUBCUTANEOUS at 07:07

## 2019-07-20 RX ADMIN — ONDANSETRON 8 MG: 8 TABLET, ORALLY DISINTEGRATING ORAL at 09:07

## 2019-07-20 RX ADMIN — HYDROMORPHONE HYDROCHLORIDE 1 MG: 1 INJECTION, SOLUTION INTRAMUSCULAR; INTRAVENOUS; SUBCUTANEOUS at 03:07

## 2019-07-20 RX ADMIN — HYDROMORPHONE HYDROCHLORIDE 1 MG: 1 INJECTION, SOLUTION INTRAMUSCULAR; INTRAVENOUS; SUBCUTANEOUS at 10:07

## 2019-07-20 RX ADMIN — HYDROMORPHONE HYDROCHLORIDE 1 MG: 1 INJECTION, SOLUTION INTRAMUSCULAR; INTRAVENOUS; SUBCUTANEOUS at 11:07

## 2019-07-20 RX ADMIN — ONDANSETRON 8 MG: 8 TABLET, ORALLY DISINTEGRATING ORAL at 08:07

## 2019-07-20 RX ADMIN — PROMETHAZINE HYDROCHLORIDE 6.25 MG: 25 INJECTION INTRAMUSCULAR; INTRAVENOUS at 09:07

## 2019-07-20 RX ADMIN — IOHEXOL 100 ML: 350 INJECTION, SOLUTION INTRAVENOUS at 06:07

## 2019-07-20 RX ADMIN — PROMETHAZINE HYDROCHLORIDE 6.25 MG: 25 INJECTION INTRAMUSCULAR; INTRAVENOUS at 04:07

## 2019-07-20 RX ADMIN — PROMETHAZINE HYDROCHLORIDE 6.25 MG: 25 INJECTION INTRAMUSCULAR; INTRAVENOUS at 02:07

## 2019-07-20 RX ADMIN — GABAPENTIN 800 MG: 400 CAPSULE ORAL at 09:07

## 2019-07-20 RX ADMIN — HYDROMORPHONE HYDROCHLORIDE 1 MG: 1 INJECTION, SOLUTION INTRAMUSCULAR; INTRAVENOUS; SUBCUTANEOUS at 06:07

## 2019-07-20 RX ADMIN — PANTOPRAZOLE SODIUM 40 MG: 40 TABLET, DELAYED RELEASE ORAL at 08:07

## 2019-07-20 NOTE — CONSULTS
[facility]  Gastroenterology  Consult Note    Patient Name: Faye Jordan  MRN: [id number]  Admission Date: 7/18/2019  Hospital Length of Stay: 2 days  Code Status: Full Code   Attending Provider: Roseline Santiago MD   Consulting Provider: Justin Umaña MD  Primary Care Physician: Alexis Dowell DO  Principal Problem:<principal problem not specified>    Inpatient consult to [facility]  Consult performed by: Justin Umaña MD  Consult ordered by: Pb Bose MD        Subjective:     HPI: Faye Jordan is a 48 y.o. male with history of  squamous cell lung cancer s/p resection on 12/13/16, chemotherapy, and XRT, and atrial fibrillation who was transferred from [facility]. After presented to the ED there on 7/17 with nausea, vomiting, abdominal pain, unable to tolerate PO intake (30 lb weight loss in last 1-2 months). In the ED, CT abdomen shows pancreatic tail mass that is causing distal duodenum obstruction, as well as multiple hepatic metastatic lesions.        He was transferred here for evaluation from [facility] for possible duodenal stent placement vs GJ tube placement.     Currently he has nausea, is not able to tolerate PO intake due to the nausea, has abdominal pain.     pT2bN0 stage IIA NSCLC (squamous) s/p right pneumonectomy with + margins followed by adjuvant chemoRT (cis) and adjuvant chemo (carbo/gem).  2/5/19 found to have biopsy proven recurrence (tracheobronchial bx) with PD-L1 of 30%. Bx of liver lesion confirms metastatic disease.         Past Medical History:   Diagnosis Date    Atrial fibrillation with rapid ventricular response 12/13/2016    Cancer     right lung    History of cancer chemotherapy     Lumbar back pain     Neuralgia     finger tips/agnieszka. legs    Pneumonia 11/2016    SOB (shortness of breath) on exertion        Past Surgical History:   Procedure Laterality Date    BRONCHOSCOPY-OPERATIVE,FLEXIBLE  N/A 12/13/2016    [facility]formed by [facility] at [facility] OR 2ND FLR    DISSECTION-LYMPH NODE Right 12/13/2016    Performed by [facility] at [facility] OR 2ND FLR    LUNG BIOPSY  11/2016    at [facility]    LUNG LOBECTOMY Right     PNEUMONECTOMY Right 12/13/2016    [facility]formed by [facility] at [facility] OR 2ND FLR    PORTACATH PLACEMENT Left     THORACOTOMY-POSTEROLATERAL Right 12/13/2016    Performed by Edward [facility]ing [facility] at [facility] OR 2ND FLR       Review of patient's allergies indicates:  No Known Allergies  Family History       Problem Relation (Age of Onset)    Brain Hemorrhage Father    No Known Problems Mother, Brother    Ovarian cancer Sister          Tobacco Use    Smoking status: Former Smoker     Packs/day: 1.50     Years: 27.00     Pack years: 40.50     Types: Cigarettes    Smokeless tobacco: Former User    Tobacco comment: quit 2012   Substance and Sexual Activity    Alcohol use: No    Drug use: No    Sexual activity: Never     Review of Systems   Constitutional: Positive for activity change, appetite change, fatigue and unexpected weight change.   HENT: Negative for trouble swallowing.    Eyes: Negative for visual disturbance.   Respiratory: Negative for shortness of breath.    Cardiovascular: Negative for chest pain.   Gastrointestinal: Positive for abdominal pain, nausea and vomiting.   Endocrine: Negative for polyuria.   Genitourinary: Negative for dysuria.   Musculoskeletal: Negative for arthralgias.   Neurological: Positive for weakness. Negative for dizziness and headaches.     Objective:     Vital Signs (Most Recent):  Temp: 98 °F (36.7 °C) (07/20/19 0757)  Pulse: 77 (07/20/19 0757)  Resp: 18 (07/20/19 0757)  BP: 102/64 (07/20/19 0757)  SpO2: 97 % (07/20/19 0757) Vital Signs (24h Range):  Temp:  [96.4 °F (35.8 °C)-98 °F (36.7 °C)] 98 °F (36.7 °C)  Pulse:  [65-92] 77  Resp:  [16-18] 18  SpO2:  [97 %-99 %] 97 %  BP: (102-118)/(60-79) 102/64     Weight: 79.4 kg (175 lb)  (07/19/19 0042)  Body mass index is 26.61 kg/m².      Physical Exam   HENT:   Head: Normocephalic and atraumatic.   Eyes: Conjunctivae and EOM are normal.   Neck: Neck supple.   Cardiovascular: Normal rate and regular rhythm.   Pulmonary/Chest: Effort normal and breath sounds normal.   Abdominal: Soft. Normal appearance and bowel sounds are normal. He exhibits no distension. There is generalized tenderness and tenderness in the epigastric area. There is no rigidity, no rebound and no guarding.   Neurological: He is alert.   Skin: Skin is warm and dry.       Significant Labs:  All pertinent lab results from the last 24 hours have been reviewed.    Significant Imaging:  Imaging results within the past 24 hours have been reviewed.    Assessment/Plan:      48 y.o. male with history of  squamous cell lung cancer s/p resection on 12/13/16, chemotherapy, and XRT, and atrial fibrillation who was transferred from [facility] after CT abdomen shows pancreatic tail mass that is causing distal duodenum obstruction, as well as multiple hepatic metastatic lesions. He has symptoms of nausea, vomiting, and abdominal pain    -NPO from midnight on 7/22  -plan for Duodenal Stent placement on 7/22  -plan discussed with with Dr. Yoder        Thank you for your consult.     Justin Umaña MD  Gastroenterology Fellow PGY IV   [facility]

## 2019-07-20 NOTE — SUBJECTIVE & OBJECTIVE
Interval History: No acute events overnight  Denies abdominal pain  Tolerating clear liquid diet without n/v    Medications:  Continuous Infusions:   sodium chloride 0.9% 75 mL/hr at 07/19/19 1157     Scheduled Meds:   fentaNYL  1 patch Transdermal Q72H    gabapentin  800 mg Oral TID    lidocaine (PF) 10 mg/ml (1%)  1 mL Other Once    pantoprazole  40 mg Oral Daily     PRN Meds:acetaminophen, HYDROmorphone, ondansetron, oxyCODONE, promethazine (PHENERGAN) IVPB, sodium chloride 0.9%     Review of patient's allergies indicates:  No Known Allergies  Objective:     Vital Signs (Most Recent):  Temp: 98 °F (36.7 °C) (07/20/19 0757)  Pulse: 77 (07/20/19 0757)  Resp: 18 (07/20/19 0757)  BP: 102/64 (07/20/19 0757)  SpO2: 97 % (07/20/19 0757) Vital Signs (24h Range):  Temp:  [95.9 °F (35.5 °C)-98 °F (36.7 °C)] 98 °F (36.7 °C)  Pulse:  [64-92] 77  Resp:  [16-18] 18  SpO2:  [97 %-99 %] 97 %  BP: (102-118)/(60-79) 102/64     Weight: 79.4 kg (175 lb)  Body mass index is 26.61 kg/m².    Intake/Output - Last 3 Shifts       07/18 0700 - 07/19 0659 07/19 0700 - 07/20 0659 07/20 0700 - 07/21 0659    P.O.  70     Total Intake(mL/kg)  70 (0.9)     Net  +70            Urine Occurrence  2 x           Physical Exam   Constitutional: He is oriented to person, place, and time. He appears well-developed and well-nourished.   Cardiovascular: Normal rate and regular rhythm.   Pulmonary/Chest: Effort normal. No respiratory distress.   Abdominal: Soft. He exhibits no distension. There is no tenderness.   Neurological: He is alert and oriented to person, place, and time.   Psychiatric: He has a normal mood and affect.   Vitals reviewed.      Significant Labs:  Reviewed    Significant Diagnostics:  CT abd reviewed

## 2019-07-20 NOTE — PLAN OF CARE
Patient is a 48 year old male admitted in transfer from [facility] 7/18/2019 with Duadenal Obstruction, Vomiting, Diarrhea.  Patient's discharge plan and needs pending medical plan.    Patient alone in room during visit.  Patient lives alone in a one story, first floor apartment.  Patient uses a rolling walker for ambulation.  Patient stated he was active with a home health company, but, does not know the name of company and does not know who ordered home health for him.  Patient became agitated and stated he did not feel like answering questions.  CM name and number written on white board in patient's room with request to call for any questions and concerns.  Will continue to follow for needs.    PCP  Alexis Dowell DO  [address] [facility] [phone]      [facility] [address] [place] [address] [address] AT [facility] [place] [address] [address]  [facility] [address]  Phone: [phone] Fax: [phone]    Extended Emergency Contact Information  Primary Emergency Contact: Antonio Jordan   [place]  Mobile Phone: [phone]  Relation: Brother  Secondary Emergency Contact: Maria Antonia Smiley   [place]  Mobile Phone: [phone]  Relation: Other       07/19/19 0948   Discharge Assessment   Assessment Type Discharge Planning Assessment   Confirmed/corrected address and phone number on facesheet? Yes   Assessment information obtained from? Patient   Expected Length of Stay (days) 4   Prior to hospitilization cognitive status: Alert/Oriented   Prior to hospitalization functional status: Independent;Assistive Equipment   Current cognitive status: Alert/Oriented   Current Functional Status: Independent;Assistive Equipment   Facility Arrived From: [facility]   Lives With alone   Able to Return to Prior Arrangements yes   Is patient able to care for self after discharge? Yes   Who are  your caregiver(s) and their phone number(s)? family   Patient's perception of discharge disposition home or selfcare   Equipment Currently Used at Home walker, rolling   Do you have any problems affording any of your prescribed medications? No   Is the patient taking medications as prescribed? yes   Does the patient have transportation home? Yes   Transportation Anticipated family or friend will provide   Discharge Plan A Home;Home Health   Discharge Plan B Home   DME Needed Upon Discharge  none   Patient/Family in Agreement with Plan yes

## 2019-07-20 NOTE — PROGRESS NOTES
[facility]  General Surgery  Progress Note    Subjective:     History of Present Illness:  Faye Jordan is a 48 y.o. male with PMHx of squamous cell lung cancer s/p resection on 12/13/16, chemotherapy, and XRT, and atrial fibrillation who was transferred from [facility] for higher level of care. He went to the [place] ED on the night of 7/17 with severe epigastric abdominal pain after completing a course of chemo. This pain onset 3 days ago and has occurred intermittently for the past month. The pain is constant and is made worse with eating and movement. The pain is associated with vomiting (multiple episodes yesterday, none today) and diarrhea. He has had a decreased appetite for the past two days secondary to this pain/emesis. In the ED he was found to have obstruction of the distal duodenum due to a pancreatic tail mass, as well as multiple hepatic metastatic lesions and fluid at the right lung base. Upon arrival at [facility], he continues to c/o severe epigastric pain, though he denies nausea/vomiting/diarrhea. He adds that he has lost about 30 pounds in the last 6 weeks.   He was also recently admitted at [facility] from 7/3 to 7/9 for the same symptoms. He was treated medically for the duodenal obstruction with NGT and was later discharged without further intervention.   He denies fever, CP, dyspnea, hematemesis, hematochezia, melena, dysuria.     Initially pT2bN0 stage IIA NSCLC (squamous) s/p right pneumonectomy with + margins followed by adjuvant chemoRT (cis) and adjuvant chemo (carbo/gem).  2/5/19 found to have biopsy proven recurrence (tracheobronchial bx) with PD-L1 of 30%. Bx of liver lesion confirms metastatic disease.              Post-Op Info:  * No surgery found *         Interval History: No acute events overnight  Denies abdominal pain  Tolerating clear liquid diet without n/v    Medications:  Continuous Infusions:   sodium chloride 0.9% 75 mL/hr at 07/19/19  1157     Scheduled Meds:   fentaNYL  1 patch Transdermal Q72H    gabapentin  800 mg Oral TID    lidocaine (PF) 10 mg/ml (1%)  1 mL Other Once    pantoprazole  40 mg Oral Daily     PRN Meds:acetaminophen, HYDROmorphone, ondansetron, oxyCODONE, promethazine (PHENERGAN) IVPB, sodium chloride 0.9%     Review of patient's allergies indicates:  No Known Allergies  Objective:     Vital Signs (Most Recent):  Temp: 98 °F (36.7 °C) (07/20/19 0757)  Pulse: 77 (07/20/19 0757)  Resp: 18 (07/20/19 0757)  BP: 102/64 (07/20/19 0757)  SpO2: 97 % (07/20/19 0757) Vital Signs (24h Range):  Temp:  [95.9 °F (35.5 °C)-98 °F (36.7 °C)] 98 °F (36.7 °C)  Pulse:  [64-92] 77  Resp:  [16-18] 18  SpO2:  [97 %-99 %] 97 %  BP: (102-118)/(60-79) 102/64     Weight: 79.4 kg (175 lb)  Body mass index is 26.61 kg/m².    Intake/Output - Last 3 Shifts       07/18 0700 - 07/19 0659 07/19 0700 - 07/20 0659 07/20 0700 - 07/21 0659    P.O.  70     Total Intake(mL/kg)  70 (0.9)     Net  +70            Urine Occurrence  2 x           Physical Exam   Constitutional: He is oriented to person, place, and time. He appears well-developed and well-nourished.   Cardiovascular: Normal rate and regular rhythm.   Pulmonary/Chest: Effort normal. No respiratory distress.   Abdominal: Soft. He exhibits no distension. There is no tenderness.   Neurological: He is alert and oriented to person, place, and time.   Psychiatric: He has a normal mood and affect.   Vitals reviewed.      Significant Labs:  Reviewed    Significant Diagnostics:  CT abd reviewed    Assessment/Plan:     Duodenal obstruction   Faye Jordan is a 48 y.o. male with PMHx of squamous cell lung cancer s/p pneumonectomy in 12/13/16, chemotherapy, and XRT, and atrial fibrillation who was transferred from [facility] for higher level of care. His squamous cell lung cancer has biopsy-proven metastases to his liver and he has also developed a mass of the tail of his pancreas that is causing a  duodenal obstruction.     - Diet as tolerated  - Pain meds prn, antiemetics prn  - Daily labs  - [facility] consult for possible duodenal stent vs GJ placement        Pb Bose MD  General Surgery  [facility]

## 2019-07-21 PROCEDURE — 63600175 PHARM REV CODE 636 W HCPCS: Performed by: STUDENT IN AN ORGANIZED HEALTH CARE EDUCATION/TRAINING PROGRAM

## 2019-07-21 PROCEDURE — 25000003 PHARM REV CODE 250: Performed by: STUDENT IN AN ORGANIZED HEALTH CARE EDUCATION/TRAINING PROGRAM

## 2019-07-21 PROCEDURE — 11000001 HC ACUTE MED/SURG PRIVATE ROOM

## 2019-07-21 RX ADMIN — ACETAMINOPHEN 650 MG: 325 TABLET ORAL at 03:07

## 2019-07-21 RX ADMIN — PANTOPRAZOLE SODIUM 40 MG: 40 TABLET, DELAYED RELEASE ORAL at 08:07

## 2019-07-21 RX ADMIN — GABAPENTIN 800 MG: 400 CAPSULE ORAL at 02:07

## 2019-07-21 RX ADMIN — SODIUM CHLORIDE: 0.9 INJECTION, SOLUTION INTRAVENOUS at 05:07

## 2019-07-21 RX ADMIN — ONDANSETRON 8 MG: 8 TABLET, ORALLY DISINTEGRATING ORAL at 01:07

## 2019-07-21 RX ADMIN — SODIUM CHLORIDE 1000 ML: 0.9 INJECTION, SOLUTION INTRAVENOUS at 08:07

## 2019-07-21 RX ADMIN — HYDROMORPHONE HYDROCHLORIDE 1 MG: 1 INJECTION, SOLUTION INTRAMUSCULAR; INTRAVENOUS; SUBCUTANEOUS at 08:07

## 2019-07-21 RX ADMIN — GABAPENTIN 800 MG: 400 CAPSULE ORAL at 08:07

## 2019-07-21 RX ADMIN — GABAPENTIN 800 MG: 400 CAPSULE ORAL at 09:07

## 2019-07-21 RX ADMIN — PROMETHAZINE HYDROCHLORIDE 6.25 MG: 25 INJECTION INTRAMUSCULAR; INTRAVENOUS at 01:07

## 2019-07-21 RX ADMIN — HYDROMORPHONE HYDROCHLORIDE 1 MG: 1 INJECTION, SOLUTION INTRAMUSCULAR; INTRAVENOUS; SUBCUTANEOUS at 01:07

## 2019-07-21 RX ADMIN — SODIUM CHLORIDE: 0.9 INJECTION, SOLUTION INTRAVENOUS at 08:07

## 2019-07-21 RX ADMIN — HYDROMORPHONE HYDROCHLORIDE 1 MG: 1 INJECTION, SOLUTION INTRAMUSCULAR; INTRAVENOUS; SUBCUTANEOUS at 05:07

## 2019-07-21 RX ADMIN — HYDROMORPHONE HYDROCHLORIDE 1 MG: 1 INJECTION, SOLUTION INTRAMUSCULAR; INTRAVENOUS; SUBCUTANEOUS at 10:07

## 2019-07-21 NOTE — TREATMENT PLAN
GI Treatment Plan    Faye Jordan is a 48 y.o. male admitted to hospital 7/18/2019 (Hospital Day: 4) due to <principal problem not specified>.     Interval History    48 y.o. male with history of  squamous cell lung cancer s/p resection on 12/13/16, chemotherapy, and XRT, and atrial fibrillation who was transferred from [facility] after CT abdomen shows pancreatic tail mass that is causing distal duodenum obstruction, as well as multiple hepatic metastatic lesions. He has symptoms of nausea, vomiting, requiring antiemetics, not trying PO intake.            Plan  -NPO from midnight on 7/22  -plan for Duodenal Stent placement on 7/22  -plan discussed with with Dr. Yoder    - We will continue to follow.    Thank you for involving us in the care of Faye Jordan. Please call with any additional questions, concerns or changes in the patient's clinical status.    Justin Umaña MD  Gastroenterology Fellow  Spectralink: [phone]

## 2019-07-21 NOTE — PLAN OF CARE
Problem: Adult Inpatient Plan of Care  Goal: Plan of Care Review  Outcome: Ongoing (interventions implemented as appropriate)  Fair hours constantly requesting pain and nausea meds, because he could have them. I educated him on the management and usage, became upset when he couldn't get them when he thought they were due. he wanted to stack them back to back, informed him of the dangers of that being his blood pressure could bottom out, stated he didn't care. I told him I did and he would get them only if it was safe to do so . He received pain med once and nausea twice, he slept well, would asked for meds and be asleep. Asked for pain med, I check his bp and it was 84/50. I told him he had to wake up some more and let his bp come up some. He didn't get upset, just stated he was going back to sleep. Last bp 100/58. Iv fluids infusing.safety maintained.

## 2019-07-21 NOTE — NURSING
Rapid Response team notified me of abnormal vital signs on patient, Evaluated patient and Vital Signs as follows 83/53, 18, 112, 98.1, 97%. Patient stated he felt fine and had no complaints. will notify team.

## 2019-07-21 NOTE — SUBJECTIVE & OBJECTIVE
Interval History: No acute events overnight  Tolerating some PO intake with intermittent nausea and abdominal pain    Medications:  Continuous Infusions:   sodium chloride 0.9% 75 mL/hr at 07/21/19 0555     Scheduled Meds:   fentaNYL  1 patch Transdermal Q72H    gabapentin  800 mg Oral TID    lidocaine (PF) 10 mg/ml (1%)  1 mL Other Once    pantoprazole  40 mg Oral Daily     PRN Meds:acetaminophen, HYDROmorphone, ondansetron, oxyCODONE, promethazine (PHENERGAN) IVPB, sodium chloride 0.9%     Review of patient's allergies indicates:  No Known Allergies  Objective:     Vital Signs (Most Recent):  Temp: 98.3 °F (36.8 °C) (07/21/19 0740)  Pulse: (!) 117 (07/21/19 0740)  Resp: 18 (07/21/19 0740)  BP: (!) 100/56 (07/21/19 0740)  SpO2: 99 % (07/21/19 0740) Vital Signs (24h Range):  Temp:  [97.6 °F (36.4 °C)-99.6 °F (37.6 °C)] 98.3 °F (36.8 °C)  Pulse:  [] 117  Resp:  [18-20] 18  SpO2:  [93 %-99 %] 99 %  BP: ()/(56-81) 100/56     Weight: 79.4 kg (175 lb)  Body mass index is 26.61 kg/m².    Intake/Output - Last 3 Shifts       07/19 0700 - 07/20 0659 07/20 0700 - 07/21 0659 07/21 0700 - 07/22 0659    P.O. 70 120     I.V. (mL/kg)  900 (11.3)     IV Piggyback  50     Total Intake(mL/kg) 70 (0.9) 1070 (13.5)     Net +70 +1070            Urine Occurrence 2 x            Physical Exam   Constitutional: He is oriented to person, place, and time. He appears well-developed and well-nourished.   Cardiovascular: Normal rate and regular rhythm.   Pulmonary/Chest: Effort normal. No respiratory distress.   Abdominal: Soft. He exhibits no distension. There is no tenderness.   Neurological: He is alert and oriented to person, place, and time.   Psychiatric: He has a normal mood and affect.   Vitals reviewed.      Significant Labs:  Reviewed    Significant Diagnostics:  None

## 2019-07-21 NOTE — PLAN OF CARE
Problem: Adult Inpatient Plan of Care  Goal: Plan of Care Review  Outcome: Ongoing (interventions implemented as appropriate)  Patient up and ambulates to bathroom, treated for pain as requested and ordered. Drinking fluids but not eating much, NPO at midnight. Treated for nausea 1 time. No emesis. Plan of care reviewed and agreed. Continue with plan of care.

## 2019-07-21 NOTE — CODE/ RAPID DOCUMENTATION
Rapid Response Nurse Chart Check     Chart check completed, abnormal VS noted, bedside RNHanna contacted and aware of low grade temp and generalized pain. PRN tylenol given per RN.  No other concerns   verbalized at this time, instructed to call [phone] for further concerns or assistance.

## 2019-07-21 NOTE — PROGRESS NOTES
[facility]  General Surgery  Progress Note    Subjective:     History of Present Illness:  Faye Jordan is a 48 y.o. male with PMHx of squamous cell lung cancer s/p resection on 12/13/16, chemotherapy, and XRT, and atrial fibrillation who was transferred from [facility] for higher level of care. He went to the [place] ED on the night of 7/17 with severe epigastric abdominal pain after completing a course of chemo. This pain onset 3 days ago and has occurred intermittently for the past month. The pain is constant and is made worse with eating and movement. The pain is associated with vomiting (multiple episodes yesterday, none today) and diarrhea. He has had a decreased appetite for the past two days secondary to this pain/emesis. In the ED he was found to have obstruction of the distal duodenum due to a pancreatic tail mass, as well as multiple hepatic metastatic lesions and fluid at the right lung base. [place] [facility] he continues to c/o severe epigastric pain, though he denies nausea/vomiting/diarrhea. He adds that he has lost about 30 pounds in the last 6 weeks.   He was also recently admitted at [facility] from 7/3 to 7/9 for the same symptoms. He was treated medically for the duodenal obstruction with NGT and was later discharged without further intervention.   He denies fever, CP, dyspnea, hematemesis, hematochezia, melena, dysuria.     Initially pT2bN0 stage IIA NSCLC (squamous) s/p right pneumonectomy with + margins followed by adjuvant chemoRT (cis) and adjuvant chemo (carbo/gem).  2/5/19 found to have biopsy proven recurrence (tracheobronchial bx) with PD-L1 of 30%. Bx of liver lesion confirms metastatic disease.              Post-Op Info:  Procedure(s) (LRB):  ENDOSCOPY, POUCH, SMALL INTESTINE, DIAGNOSTIC (N/A)         Interval History: No acute events overnight  Tolerating some PO intake with intermittent nausea and abdominal  pain    Medications:  Continuous Infusions:   sodium chloride 0.9% 75 mL/hr at 07/21/19 0555     Scheduled Meds:   fentaNYL  1 patch Transdermal Q72H    gabapentin  800 mg Oral TID    lidocaine (PF) 10 mg/ml (1%)  1 mL Other Once    pantoprazole  40 mg Oral Daily     PRN Meds:acetaminophen, HYDROmorphone, ondansetron, oxyCODONE, promethazine (PHENERGAN) IVPB, sodium chloride 0.9%     Review of patient's allergies indicates:  No Known Allergies  Objective:     Vital Signs (Most Recent):  Temp: 98.3 °F (36.8 °C) (07/21/19 0740)  Pulse: (!) 117 (07/21/19 0740)  Resp: 18 (07/21/19 0740)  BP: (!) 100/56 (07/21/19 0740)  SpO2: 99 % (07/21/19 0740) Vital Signs (24h Range):  Temp:  [97.6 °F (36.4 °C)-99.6 °F (37.6 °C)] 98.3 °F (36.8 °C)  Pulse:  [] 117  Resp:  [18-20] 18  SpO2:  [93 %-99 %] 99 %  BP: ()/(56-81) 100/56     Weight: 79.4 kg (175 lb)  Body mass index is 26.61 kg/m².    Intake/Output - Last 3 Shifts       07/19 0700 - 07/20 0659 07/20 0700 - 07/21 0659 07/21 0700 - 07/22 0659    P.O. 70 120     I.V. (mL/kg)  900 (11.3)     IV Piggyback  50     Total Intake(mL/kg) 70 (0.9) 1070 (13.5)     Net +70 +1070            Urine Occurrence 2 x            Physical Exam   Constitutional: He is oriented to person, place, and time. He appears well-developed and well-nourished.   Cardiovascular: Normal rate and regular rhythm.   Pulmonary/Chest: Effort normal. No respiratory distress.   Abdominal: Soft. He exhibits no distension. There is no tenderness.   Neurological: He is alert and oriented to person, place, and time.   Psychiatric: He has a normal mood and affect.   Vitals reviewed.      Significant Labs:  Reviewed    Significant Diagnostics:  None    Assessment/Plan:     Duodenal obstruction   Faye Jordan is a 48 y.o. male with PMHx of squamous cell lung cancer s/p pneumonectomy in 12/13/16, chemotherapy, and XRT, and atrial fibrillation who was transferred from [facility] for higher level of  care. His squamous cell lung cancer has biopsy-proven metastases to his liver and he has also developed a mass of the tail of his pancreas that is causing a duodenal obstruction.     - Diet as tolerated; NPO at midnight  - Pain meds prn, antiemetics prn  - Daily labs  - [facility] consulted and plan for possible duodenal stent vs GJ placement tomorrow        Pb Bose MD  General Surgery  [facility]

## 2019-07-21 NOTE — ASSESSMENT & PLAN NOTE
Faye Jordan is a 48 y.o. male with PMHx of squamous cell lung cancer s/p pneumonectomy in 12/13/16, chemotherapy, and XRT, and atrial fibrillation who was transferred from [facility] for higher level of care. His squamous cell lung cancer has biopsy-proven metastases to his liver and he has also developed a mass of the tail of his pancreas that is causing a duodenal obstruction.     - Diet as tolerated; NPO at midnight  - Pain meds prn, antiemetics prn  - Daily labs  - [facility] consulted and plan for possible duodenal stent vs GJ placement tomorrow

## 2019-07-22 ENCOUNTER — ANESTHESIA (OUTPATIENT)
Dept: ENDOSCOPY | Facility: HOSPITAL | Age: 49
DRG: 375 | End: 2019-07-22
Payer: MEDICARE

## 2019-07-22 ENCOUNTER — ANESTHESIA EVENT (OUTPATIENT)
Dept: ENDOSCOPY | Facility: HOSPITAL | Age: 49
DRG: 375 | End: 2019-07-22
Payer: MEDICARE

## 2019-07-22 LAB
ALBUMIN SERPL BCP-MCNC: 2.9 G/DL (ref 3.5–5.2)
ALP SERPL-CCNC: 85 U/L (ref 55–135)
ALT SERPL W/O P-5'-P-CCNC: 60 U/L (ref 10–44)
ANION GAP SERPL CALC-SCNC: 14 MMOL/L (ref 8–16)
AST SERPL-CCNC: 30 U/L (ref 10–40)
BASOPHILS # BLD AUTO: 0.01 K/UL (ref 0–0.2)
BASOPHILS NFR BLD: 0.5 % (ref 0–1.9)
BILIRUB SERPL-MCNC: 0.7 MG/DL (ref 0.1–1)
BUN SERPL-MCNC: 9 MG/DL (ref 6–20)
CALCIUM SERPL-MCNC: 8.8 MG/DL (ref 8.7–10.5)
CHLORIDE SERPL-SCNC: 97 MMOL/L (ref 95–110)
CO2 SERPL-SCNC: 23 MMOL/L (ref 23–29)
CREAT SERPL-MCNC: 0.9 MG/DL (ref 0.5–1.4)
DIFFERENTIAL METHOD: ABNORMAL
EOSINOPHIL # BLD AUTO: 0 K/UL (ref 0–0.5)
EOSINOPHIL NFR BLD: 0 % (ref 0–8)
ERYTHROCYTE [DISTWIDTH] IN BLOOD BY AUTOMATED COUNT: 17.8 % (ref 11.5–14.5)
EST. GFR  (AFRICAN AMERICAN): >60 ML/MIN/1.73 M^2
EST. GFR  (NON AFRICAN AMERICAN): >60 ML/MIN/1.73 M^2
GLUCOSE SERPL-MCNC: 84 MG/DL (ref 70–110)
HCT VFR BLD AUTO: 31.8 % (ref 40–54)
HGB BLD-MCNC: 10.3 G/DL (ref 14–18)
IMM GRANULOCYTES # BLD AUTO: 0.04 K/UL (ref 0–0.04)
IMM GRANULOCYTES NFR BLD AUTO: 1.9 % (ref 0–0.5)
INR PPP: 1.1 (ref 0.8–1.2)
LYMPHOCYTES # BLD AUTO: 0.3 K/UL (ref 1–4.8)
LYMPHOCYTES NFR BLD: 14 % (ref 18–48)
MCH RBC QN AUTO: 29.1 PG (ref 27–31)
MCHC RBC AUTO-ENTMCNC: 32.4 G/DL (ref 32–36)
MCV RBC AUTO: 90 FL (ref 82–98)
MONOCYTES # BLD AUTO: 0.1 K/UL (ref 0.3–1)
MONOCYTES NFR BLD: 6.5 % (ref 4–15)
NEUTROPHILS # BLD AUTO: 1.7 K/UL (ref 1.8–7.7)
NEUTROPHILS NFR BLD: 77.1 % (ref 38–73)
NRBC BLD-RTO: 0 /100 WBC
PLATELET # BLD AUTO: 63 K/UL (ref 150–350)
PMV BLD AUTO: 11.1 FL (ref 9.2–12.9)
POTASSIUM SERPL-SCNC: 3.4 MMOL/L (ref 3.5–5.1)
PROT SERPL-MCNC: 6 G/DL (ref 6–8.4)
PROTHROMBIN TIME: 11.6 SEC (ref 9–12.5)
RBC # BLD AUTO: 3.54 M/UL (ref 4.6–6.2)
SODIUM SERPL-SCNC: 134 MMOL/L (ref 136–145)
WBC # BLD AUTO: 2.14 K/UL (ref 3.9–12.7)

## 2019-07-22 PROCEDURE — 74360 X-RAY GUIDE GI DILATION: CPT | Performed by: INTERNAL MEDICINE

## 2019-07-22 PROCEDURE — 25000003 PHARM REV CODE 250: Performed by: STUDENT IN AN ORGANIZED HEALTH CARE EDUCATION/TRAINING PROGRAM

## 2019-07-22 PROCEDURE — D9220A PRA ANESTHESIA: Mod: CRNA,,, | Performed by: NURSE ANESTHETIST, CERTIFIED REGISTERED

## 2019-07-22 PROCEDURE — 37000009 HC ANESTHESIA EA ADD 15 MINS: Performed by: INTERNAL MEDICINE

## 2019-07-22 PROCEDURE — 85610 PROTHROMBIN TIME: CPT

## 2019-07-22 PROCEDURE — 74360 PR  X-RAY GUIDE, GI DILATION: ICD-10-PCS | Mod: 26,,, | Performed by: INTERNAL MEDICINE

## 2019-07-22 PROCEDURE — 27202304 HC CANNULA, ERCP: Performed by: INTERNAL MEDICINE

## 2019-07-22 PROCEDURE — 74360 X-RAY GUIDE GI DILATION: CPT | Mod: 26,,, | Performed by: INTERNAL MEDICINE

## 2019-07-22 PROCEDURE — 37000008 HC ANESTHESIA 1ST 15 MINUTES: Performed by: INTERNAL MEDICINE

## 2019-07-22 PROCEDURE — 25000003 PHARM REV CODE 250: Performed by: NURSE ANESTHETIST, CERTIFIED REGISTERED

## 2019-07-22 PROCEDURE — 43266 EGD ENDOSCOPIC STENT PLACE: CPT | Performed by: INTERNAL MEDICINE

## 2019-07-22 PROCEDURE — D9220A PRA ANESTHESIA: ICD-10-PCS | Mod: ANES,,, | Performed by: ANESTHESIOLOGY

## 2019-07-22 PROCEDURE — 36415 COLL VENOUS BLD VENIPUNCTURE: CPT

## 2019-07-22 PROCEDURE — 85025 COMPLETE CBC W/AUTO DIFF WBC: CPT

## 2019-07-22 PROCEDURE — 11000001 HC ACUTE MED/SURG PRIVATE ROOM

## 2019-07-22 PROCEDURE — 25000003 PHARM REV CODE 250: Performed by: ANESTHESIOLOGY

## 2019-07-22 PROCEDURE — D9220A PRA ANESTHESIA: ICD-10-PCS | Mod: CRNA,,, | Performed by: NURSE ANESTHETIST, CERTIFIED REGISTERED

## 2019-07-22 PROCEDURE — 43266 EGD ENDOSCOPIC STENT PLACE: CPT | Mod: ,,, | Performed by: INTERNAL MEDICINE

## 2019-07-22 PROCEDURE — C1876 STENT, NON-COA/NON-COV W/DEL: HCPCS | Performed by: INTERNAL MEDICINE

## 2019-07-22 PROCEDURE — 43266 PR EGD, FLEX, W/PLCMT, STENT: ICD-10-PCS | Mod: ,,, | Performed by: INTERNAL MEDICINE

## 2019-07-22 PROCEDURE — C1769 GUIDE WIRE: HCPCS | Performed by: INTERNAL MEDICINE

## 2019-07-22 PROCEDURE — 25500020 PHARM REV CODE 255: Performed by: INTERNAL MEDICINE

## 2019-07-22 PROCEDURE — 63600175 PHARM REV CODE 636 W HCPCS: Performed by: STUDENT IN AN ORGANIZED HEALTH CARE EDUCATION/TRAINING PROGRAM

## 2019-07-22 PROCEDURE — D9220A PRA ANESTHESIA: Mod: ANES,,, | Performed by: ANESTHESIOLOGY

## 2019-07-22 PROCEDURE — 63600175 PHARM REV CODE 636 W HCPCS: Performed by: NURSE ANESTHETIST, CERTIFIED REGISTERED

## 2019-07-22 PROCEDURE — 63600175 PHARM REV CODE 636 W HCPCS

## 2019-07-22 PROCEDURE — 63600175 PHARM REV CODE 636 W HCPCS: Performed by: ANESTHESIOLOGY

## 2019-07-22 PROCEDURE — 80053 COMPREHEN METABOLIC PANEL: CPT

## 2019-07-22 DEVICE — STENT SYSTEM WITH ANCHOR LOCK DELIVERY SYSTEM
Type: IMPLANTABLE DEVICE | Site: DUODENUM | Status: FUNCTIONAL
Brand: WALLFLEX™ DUODENAL

## 2019-07-22 RX ORDER — HYDROMORPHONE HYDROCHLORIDE 1 MG/ML
0.2 INJECTION, SOLUTION INTRAMUSCULAR; INTRAVENOUS; SUBCUTANEOUS EVERY 5 MIN PRN
Status: DISCONTINUED | OUTPATIENT
Start: 2019-07-22 | End: 2019-07-22 | Stop reason: HOSPADM

## 2019-07-22 RX ORDER — SUCCINYLCHOLINE CHLORIDE 20 MG/ML
INJECTION INTRAMUSCULAR; INTRAVENOUS
Status: DISCONTINUED | OUTPATIENT
Start: 2019-07-22 | End: 2019-07-22

## 2019-07-22 RX ORDER — SODIUM CHLORIDE 9 MG/ML
INJECTION, SOLUTION INTRAVENOUS CONTINUOUS PRN
Status: DISCONTINUED | OUTPATIENT
Start: 2019-07-22 | End: 2019-07-22

## 2019-07-22 RX ORDER — SODIUM CHLORIDE 9 MG/ML
INJECTION, SOLUTION INTRAVENOUS CONTINUOUS
Status: DISCONTINUED | OUTPATIENT
Start: 2019-07-22 | End: 2019-07-22

## 2019-07-22 RX ORDER — ONDANSETRON 2 MG/ML
INJECTION INTRAMUSCULAR; INTRAVENOUS
Status: DISCONTINUED | OUTPATIENT
Start: 2019-07-22 | End: 2019-07-22

## 2019-07-22 RX ORDER — MIDAZOLAM HYDROCHLORIDE 1 MG/ML
INJECTION, SOLUTION INTRAMUSCULAR; INTRAVENOUS
Status: DISCONTINUED | OUTPATIENT
Start: 2019-07-22 | End: 2019-07-22

## 2019-07-22 RX ORDER — ROCURONIUM BROMIDE 10 MG/ML
INJECTION, SOLUTION INTRAVENOUS
Status: DISCONTINUED | OUTPATIENT
Start: 2019-07-22 | End: 2019-07-22

## 2019-07-22 RX ORDER — FENTANYL CITRATE 50 UG/ML
INJECTION, SOLUTION INTRAMUSCULAR; INTRAVENOUS
Status: DISCONTINUED | OUTPATIENT
Start: 2019-07-22 | End: 2019-07-22

## 2019-07-22 RX ORDER — PHENYLEPHRINE HYDROCHLORIDE 10 MG/ML
INJECTION INTRAVENOUS
Status: DISCONTINUED | OUTPATIENT
Start: 2019-07-22 | End: 2019-07-22

## 2019-07-22 RX ORDER — LIDOCAINE HCL/PF 100 MG/5ML
SYRINGE (ML) INTRAVENOUS
Status: DISCONTINUED | OUTPATIENT
Start: 2019-07-22 | End: 2019-07-22

## 2019-07-22 RX ORDER — HYDROMORPHONE HYDROCHLORIDE 1 MG/ML
INJECTION, SOLUTION INTRAMUSCULAR; INTRAVENOUS; SUBCUTANEOUS
Status: COMPLETED
Start: 2019-07-22 | End: 2019-07-22

## 2019-07-22 RX ORDER — SODIUM CHLORIDE 0.9 % (FLUSH) 0.9 %
3 SYRINGE (ML) INJECTION
Status: DISCONTINUED | OUTPATIENT
Start: 2019-07-22 | End: 2019-07-22 | Stop reason: HOSPADM

## 2019-07-22 RX ORDER — PROPOFOL 10 MG/ML
VIAL (ML) INTRAVENOUS
Status: DISCONTINUED | OUTPATIENT
Start: 2019-07-22 | End: 2019-07-22

## 2019-07-22 RX ADMIN — HYDROMORPHONE HYDROCHLORIDE 1 MG: 1 INJECTION, SOLUTION INTRAMUSCULAR; INTRAVENOUS; SUBCUTANEOUS at 12:07

## 2019-07-22 RX ADMIN — LIDOCAINE HYDROCHLORIDE 80 MG: 20 INJECTION, SOLUTION INTRAVENOUS at 09:07

## 2019-07-22 RX ADMIN — PHENYLEPHRINE HYDROCHLORIDE 100 MCG: 10 INJECTION INTRAVENOUS at 09:07

## 2019-07-22 RX ADMIN — HYDROMORPHONE HYDROCHLORIDE 0.2 MG: 1 INJECTION, SOLUTION INTRAMUSCULAR; INTRAVENOUS; SUBCUTANEOUS at 10:07

## 2019-07-22 RX ADMIN — SUCCINYLCHOLINE CHLORIDE 140 MG: 20 INJECTION, SOLUTION INTRAMUSCULAR; INTRAVENOUS at 09:07

## 2019-07-22 RX ADMIN — MIDAZOLAM HYDROCHLORIDE 2 MG: 1 INJECTION, SOLUTION INTRAMUSCULAR; INTRAVENOUS at 09:07

## 2019-07-22 RX ADMIN — HYDROMORPHONE HYDROCHLORIDE 0.2 MG: 1 INJECTION, SOLUTION INTRAMUSCULAR; INTRAVENOUS; SUBCUTANEOUS at 11:07

## 2019-07-22 RX ADMIN — GABAPENTIN 800 MG: 400 CAPSULE ORAL at 09:07

## 2019-07-22 RX ADMIN — PHENYLEPHRINE HYDROCHLORIDE 200 MCG: 10 INJECTION INTRAVENOUS at 10:07

## 2019-07-22 RX ADMIN — PHENYLEPHRINE HYDROCHLORIDE 100 MCG: 10 INJECTION INTRAVENOUS at 10:07

## 2019-07-22 RX ADMIN — HYDROMORPHONE HYDROCHLORIDE 1 MG: 1 INJECTION, SOLUTION INTRAMUSCULAR; INTRAVENOUS; SUBCUTANEOUS at 04:07

## 2019-07-22 RX ADMIN — HYDROMORPHONE HYDROCHLORIDE 1 MG: 1 INJECTION, SOLUTION INTRAMUSCULAR; INTRAVENOUS; SUBCUTANEOUS at 08:07

## 2019-07-22 RX ADMIN — GABAPENTIN 800 MG: 400 CAPSULE ORAL at 12:07

## 2019-07-22 RX ADMIN — GABAPENTIN 800 MG: 400 CAPSULE ORAL at 03:07

## 2019-07-22 RX ADMIN — ONDANSETRON 8 MG: 8 TABLET, ORALLY DISINTEGRATING ORAL at 01:07

## 2019-07-22 RX ADMIN — ONDANSETRON 8 MG: 8 TABLET, ORALLY DISINTEGRATING ORAL at 07:07

## 2019-07-22 RX ADMIN — FENTANYL 1 PATCH: 25 PATCH, EXTENDED RELEASE TRANSDERMAL at 01:07

## 2019-07-22 RX ADMIN — PANTOPRAZOLE SODIUM 40 MG: 40 TABLET, DELAYED RELEASE ORAL at 12:07

## 2019-07-22 RX ADMIN — SODIUM CHLORIDE: 0.9 INJECTION, SOLUTION INTRAVENOUS at 08:07

## 2019-07-22 RX ADMIN — FENTANYL CITRATE 50 MCG: 50 INJECTION, SOLUTION INTRAMUSCULAR; INTRAVENOUS at 09:07

## 2019-07-22 RX ADMIN — PROPOFOL 200 MG: 10 INJECTION, EMULSION INTRAVENOUS at 09:07

## 2019-07-22 RX ADMIN — IOHEXOL 4 ML: 300 INJECTION, SOLUTION INTRAVENOUS at 10:07

## 2019-07-22 RX ADMIN — ONDANSETRON 8 MG: 8 TABLET, ORALLY DISINTEGRATING ORAL at 04:07

## 2019-07-22 RX ADMIN — SODIUM CHLORIDE 1 MCG/KG/MIN: 9 INJECTION, SOLUTION INTRAVENOUS at 10:07

## 2019-07-22 RX ADMIN — HYDROMORPHONE HYDROCHLORIDE 1 MG: 1 INJECTION, SOLUTION INTRAMUSCULAR; INTRAVENOUS; SUBCUTANEOUS at 05:07

## 2019-07-22 RX ADMIN — ONDANSETRON 8 MG: 8 TABLET, ORALLY DISINTEGRATING ORAL at 11:07

## 2019-07-22 RX ADMIN — ROCURONIUM BROMIDE 5 MG: 10 INJECTION, SOLUTION INTRAVENOUS at 09:07

## 2019-07-22 RX ADMIN — SODIUM CHLORIDE: 0.9 INJECTION, SOLUTION INTRAVENOUS at 09:07

## 2019-07-22 RX ADMIN — SODIUM CHLORIDE: 0.9 INJECTION, SOLUTION INTRAVENOUS at 10:07

## 2019-07-22 RX ADMIN — ONDANSETRON 4 MG: 2 INJECTION INTRAMUSCULAR; INTRAVENOUS at 10:07

## 2019-07-22 NOTE — ASSESSMENT & PLAN NOTE
Faye Jordan is a 48 y.o. male with PMHx of squamous cell lung cancer s/p pneumonectomy in 12/13/16, chemotherapy, and XRT, and atrial fibrillation who was transferred from [facility] for higher level of care. His squamous cell lung cancer has biopsy-proven metastases to his liver and he has also developed a mass of the tail of his pancreas that is causing a duodenal obstruction.     - NPO for procedure today; can restart regular diet following procedure  - Pain meds prn, antiemetics prn  - Daily labs  - AES consulted and plan for possible duodenal stent vs GJ placement today

## 2019-07-22 NOTE — PLAN OF CARE
Patient going for ERCP (?stent placement) and possible venting g tube today and is expected to discharge home with resumption of home health tomorrow.  Patient can not remember the name of his home health company.  Will research and refer as needed.  Will continue to follow for needs.       07/22/19 9464   Discharge Reassessment   Assessment Type Discharge Planning Assessment   Discharge Plan A Home with family;Home Health   Post-Acute Status   Post-Acute Authorization HME   Discharge Delays (!) Other  (ERCP TODAY, MAY D/C 7/23/2019)

## 2019-07-22 NOTE — PLAN OF CARE
Patient here for a duodenal stent placement, pre and post procedure instructions explained, positive verbal response.

## 2019-07-22 NOTE — ANESTHESIA PREPROCEDURE EVALUATION
07/22/2019  Faye Jordan is a 48 y.o., male.  Past Medical History:   Diagnosis Date    Atrial fibrillation with rapid ventricular response 12/13/2016    Cancer     right lung    History of cancer chemotherapy     Lumbar back pain     Neuralgia     finger tips/agnieszka. legs    Pneumonia 11/2016    SOB (shortness of breath) on exertion      Patient Active Problem List   Diagnosis    Primary lung cancer    Over weight    Numbness    Hemoptysis    SOBOE (shortness of breath on exertion)    Lower urinary tract symptoms (LUTS)    History of hyperglycemia    Squamous cell carcinoma lung    Myelopathy    Progressive focal motor weakness    Chronic pain syndrome    Cervical disc disorder with radiculopathy    Ulnar neuropathy of left upper extremity    Duodenal obstruction     Past Surgical History:   Procedure Laterality Date    BRONCHOSCOPY-OPERATIVE,FLEXIBLE N/A 12/13/2016    Performed by Edward [facility]ing MD at [facility] OR [address] [place] [address]    DISSECTION-LYMPH NODE Right 12/13/2016    [facility]    LUNG BIOPSY  11/2016    at [facility]    LUNG LOBECTOMY Right     PNEUMONECTOMY Right 12/13/2016    [facility]    PORTACATH PLACEMENT Left     THORACOTOMY-POSTEROLATERAL Right 12/13/2016    [facility]     Review of patient's allergies indicates:  No Known Allergies    Anesthesia Evaluation    I have reviewed the Patient Summary Reports.    I have reviewed the Nursing Notes.   I have reviewed the Medications.     Review of Systems      Physical Exam  General:  Well nourished    Airway/Jaw/Neck:  Airway Findings: Mouth Opening: Normal Tongue: Normal  Mallampati: II  TM Distance: Normal, at least 6 cm  Jaw/Neck Findings:  Neck ROM: Normal ROM      Dental:  Dental Findings: In  tact        Mental Status:  Mental Status Findings:  Cooperative         Anesthesia Plan  Type of Anesthesia, risks & benefits discussed:  Anesthesia Type:  general  Patient's Preference:   Intra-op Monitoring Plan: standard ASA monitors  Intra-op Monitoring Plan Comments:   Post Op Pain Control Plan: multimodal analgesia and per primary service following discharge from PACU  Post Op Pain Control Plan Comments:   Induction:   IV  Beta Blocker:  Patient is not currently on a Beta-Blocker (No further documentation required).       Informed Consent: Patient understands risks and agrees with Anesthesia plan.  Questions answered. Anesthesia consent signed with patient.  ASA Score: 3     Day of Surgery Review of History & Physical:    H&P update referred to the surgeon.         Ready For Surgery From Anesthesia Perspective.

## 2019-07-22 NOTE — PLAN OF CARE
Problem: Adult Inpatient Plan of Care  Goal: Plan of Care Review  Outcome: Ongoing (interventions implemented as appropriate)  Quiet hours. VSS, ns bolus given once.npo for surgical procedure today. Safety maintained.

## 2019-07-22 NOTE — TRANSFER OF CARE
"Anesthesia Transfer of Care Note    Patient: Faye Jordan    Procedure(s) Performed: Procedure(s) (LRB):  EGD (ESOPHAGOGASTRODUODENOSCOPY) (N/A)    Patient location: PACU    Anesthesia Type: general    Transport from OR: Transported from OR on 6-10 L/min O2 by face mask with adequate spontaneous ventilation    Post pain: adequate analgesia    Post assessment: no apparent anesthetic complications    Post vital signs: stable    Level of consciousness: awake    Nausea/Vomiting: no nausea/vomiting    Complications: none    Transfer of care protocol was followed      Last vitals:   Visit Vitals  /74 (BP Location: Right arm, Patient Position: Lying)   Pulse 98   Temp 36.8 °C (98.3 °F) (Axillary)   Resp 18   Ht 5' 8" (1.727 m)   Wt 79.4 kg (175 lb)   SpO2 100%   BMI 26.61 kg/m²     "

## 2019-07-22 NOTE — PROGRESS NOTES
[facility]  General Surgery  Progress Note    Subjective:     History of Present Illness:  Faye Jordan is a 48 y.o. male with PMHx of squamous cell lung cancer s/p resection on 12/13/16, chemotherapy, and XRT, and atrial fibrillation who was transferred from [facility] for higher level of care. He went to the [place] ED on the night of 7/17 with severe epigastric abdominal pain after completing a course of chemo. This pain onset 3 days ago and has occurred intermittently for the past month. The pain is constant and is made worse with eating and movement. The pain is associated with vomiting (multiple episodes yesterday, none today) and diarrhea. He has had a decreased appetite for the past two days secondary to this pain/emesis. In the ED he was found to have obstruction of the distal duodenum due to a pancreatic tail mass, as well as multiple hepatic metastatic lesions and fluid at the right lung base. Upon arrival at [facility], he continues to c/o severe epigastric pain, though he denies nausea/vomiting/diarrhea. He adds that he has lost about 30 pounds in the last 6 weeks.   He was also recently admitted at [facility] from 7/3 to 7/9 for the same symptoms. He was treated medically for the duodenal obstruction with NGT and was later discharged without further intervention.   He denies fever, CP, dyspnea, hematemesis, hematochezia, melena, dysuria.     Initially pT2bN0 stage IIA NSCLC (squamous) s/p right pneumonectomy with + margins followed by adjuvant chemoRT (cis) and adjuvant chemo (carbo/gem).  2/5/19 found to have biopsy proven recurrence (tracheobronchial bx) with PD-L1 of 30%. Bx of liver lesion confirms metastatic disease.              Post-Op Info:  Procedure(s) (LRB):  ENDOSCOPY, POUCH, SMALL INTESTINE, DIAGNOSTIC (N/A)   Day of Surgery     Interval History: No acute events overnight  Febrile to 101.6 yesterday PM  BM x1  Continues to have intermittent nausea and  ""crampy" abdominal pain    Medications:  Continuous Infusions:   sodium chloride 0.9% 75 mL/hr at 07/21/19 2018     Scheduled Meds:   fentaNYL  1 patch Transdermal Q72H    gabapentin  800 mg Oral TID    lidocaine (PF) 10 mg/ml (1%)  1 mL Other Once    pantoprazole  40 mg Oral Daily     PRN Meds:acetaminophen, HYDROmorphone, ondansetron, oxyCODONE, promethazine (PHENERGAN) IVPB, sodium chloride 0.9%     Review of patient's allergies indicates:  No Known Allergies  Objective:     Vital Signs (Most Recent):  Temp: 98.7 °F (37.1 °C) (07/22/19 0735)  Pulse: 101 (07/22/19 0735)  Resp: 18 (07/22/19 0735)  BP: 101/65 (07/22/19 0735)  SpO2: 96 % (07/22/19 0735) Vital Signs (24h Range):  Temp:  [96.9 °F (36.1 °C)-101.6 °F (38.7 °C)] 98.7 °F (37.1 °C)  Pulse:  [] 101  Resp:  [16-20] 18  SpO2:  [96 %-99 %] 96 %  BP: ()/(50-68) 101/65     Weight: 79.4 kg (175 lb)  Body mass index is 26.61 kg/m².    Intake/Output - Last 3 Shifts       07/20 0700 - 07/21 0659 07/21 0700 - 07/22 0659 07/22 0700 - 07/23 0659    P.O. 120 360     I.V. (mL/kg) 900 (11.3) 650 (8.2)     IV Piggyback 50 1000     Total Intake(mL/kg) 1070 (13.5) 2010 (25.3)     Urine (mL/kg/hr)  700 (0.4)     Stool  0     Total Output  700     Net +1070 +1310            Stool Occurrence  1 x           Physical Exam   Constitutional: He is oriented to person, place, and time. He appears well-developed and well-nourished.   Cardiovascular: Normal rate and regular rhythm.   Pulmonary/Chest: Effort normal. No respiratory distress.   Abdominal: Soft. He exhibits distension (mild). There is no tenderness.   Neurological: He is alert and oriented to person, place, and time.   Psychiatric: He has a normal mood and affect.   Vitals reviewed.      Significant Labs:  Reviewed    Significant Diagnostics:  None    Assessment/Plan:     Duodenal obstruction   Faye Jordan is a 48 y.o. male with PMHx of squamous cell lung cancer s/p pneumonectomy in " 12/13/16, chemotherapy, and XRT, and atrial fibrillation who was transferred from [facility] for higher level of care. His squamous cell lung cancer has biopsy-proven metastases to his liver and he has also developed a mass of the tail of his pancreas that is causing a duodenal obstruction.     - NPO for procedure today; can restart regular diet following procedure  - Pain meds prn, antiemetics prn  - Daily labs  - AES consulted and plan for possible duodenal stent vs GJ placement today        Pb Bose MD  General Surgery  [facility]

## 2019-07-22 NOTE — SUBJECTIVE & OBJECTIVE
"Interval History: No acute events overnight  Febrile to 101.6 yesterday PM  BM x1  Continues to have intermittent nausea and "crampy" abdominal pain    Medications:  Continuous Infusions:   sodium chloride 0.9% 75 mL/hr at 07/21/19 2018     Scheduled Meds:   fentaNYL  1 patch Transdermal Q72H    gabapentin  800 mg Oral TID    lidocaine (PF) 10 mg/ml (1%)  1 mL Other Once    pantoprazole  40 mg Oral Daily     PRN Meds:acetaminophen, HYDROmorphone, ondansetron, oxyCODONE, promethazine (PHENERGAN) IVPB, sodium chloride 0.9%     Review of patient's allergies indicates:  No Known Allergies  Objective:     Vital Signs (Most Recent):  Temp: 98.7 °F (37.1 °C) (07/22/19 0735)  Pulse: 101 (07/22/19 0735)  Resp: 18 (07/22/19 0735)  BP: 101/65 (07/22/19 0735)  SpO2: 96 % (07/22/19 0735) Vital Signs (24h Range):  Temp:  [96.9 °F (36.1 °C)-101.6 °F (38.7 °C)] 98.7 °F (37.1 °C)  Pulse:  [] 101  Resp:  [16-20] 18  SpO2:  [96 %-99 %] 96 %  BP: ()/(50-68) 101/65     Weight: 79.4 kg (175 lb)  Body mass index is 26.61 kg/m².    Intake/Output - Last 3 Shifts       07/20 0700 - 07/21 0659 07/21 0700 - 07/22 0659 07/22 0700 - 07/23 0659    P.O. 120 360     I.V. (mL/kg) 900 (11.3) 650 (8.2)     IV Piggyback 50 1000     Total Intake(mL/kg) 1070 (13.5) 2010 (25.3)     Urine (mL/kg/hr)  700 (0.4)     Stool  0     Total Output  700     Net +1070 +1310            Stool Occurrence  1 x           Physical Exam   Constitutional: He is oriented to person, place, and time. He appears well-developed and well-nourished.   Cardiovascular: Normal rate and regular rhythm.   Pulmonary/Chest: Effort normal. No respiratory distress.   Abdominal: Soft. He exhibits distension (mild). There is no tenderness.   Neurological: He is alert and oriented to person, place, and time.   Psychiatric: He has a normal mood and affect.   Vitals reviewed.      Significant Labs:  Reviewed    Significant Diagnostics:  None  "

## 2019-07-22 NOTE — ANESTHESIA POSTPROCEDURE EVALUATION
Anesthesia Post Evaluation    Patient: Faye Jordan    Procedure(s) Performed: Procedure(s) (LRB):  EGD (ESOPHAGOGASTRODUODENOSCOPY) (N/A)    Final Anesthesia Type: general  Patient location during evaluation: PACU  Patient participation: Yes- Able to Participate  Level of consciousness: awake and alert and oriented  Post-procedure vital signs: reviewed and stable  Pain management: adequate  Airway patency: patent  PONV status at discharge: No PONV  Anesthetic complications: no      Cardiovascular status: hemodynamically stable  Respiratory status: unassisted and spontaneous ventilation  Hydration status: euvolemic  Follow-up not needed.          Vitals Value Taken Time   BP 97/68 7/22/2019 11:22 AM   Temp 36.6 °C (97.9 °F) 7/22/2019 11:15 AM   Pulse 96 7/22/2019 11:26 AM   Resp 0 7/22/2019 11:26 AM   SpO2 95 % 7/22/2019 11:26 AM   Vitals shown include unvalidated device data.      Event Time     Out of Recovery 11:46:57          Pain/Araseli Score: Pain Rating Prior to Med Admin: 5 (7/22/2019 11:00 AM)  Pain Rating Post Med Admin: 4 (7/22/2019 11:15 AM)  Araseli Score: 10 (7/22/2019 11:15 AM)

## 2019-07-22 NOTE — [employer] PATIENT INSTRUCTIONS
Discharge Summary/Instructions after an Endoscopic Procedure  Patient Name: Faye Jordan  Patient MRN: [id number]  Patient YOB: 1970  Monday, July 22, 2019  Manior Yoder MD  RESTRICTIONS:  During your procedure today, you received medications for sedation.  These   medications may affect your judgment, balance and coordination.  Therefore,   for 24 hours, you have the following restrictions:   - DO NOT drive a car, operate machinery, make legal/financial decisions,   sign important papers or drink alcohol.    ACTIVITY:  Today: no heavy lifting, straining or running due to procedural   sedation/anesthesia.  The following day: return to full activity including work.  DIET:  Eat and drink normally unless instructed otherwise.     TREATMENT FOR COMMON SIDE EFFECTS:  - Mild abdominal pain, nausea, belching, bloating or excessive gas:  rest,   eat lightly and use a heating pad.  - Sore Throat: treat with throat lozenges and/or gargle with warm salt   water.  - Because air was used during the procedure, expelling large amounts of air   from your rectum or belching is normal.  - If a bowel prep was taken, you may not have a bowel movement for 1-3 days.    This is normal.  SYMPTOMS TO WATCH FOR AND REPORT TO YOUR PHYSICIAN:  1. Abdominal pain or bloating, other than gas cramps.  2. Chest pain.  3. Back pain.  4. Signs of infection such as: chills or fever occurring within 24 hours   after the procedure.  5. Rectal bleeding, which would show as bright red, maroon, or black stools.   (A tablespoon of blood from the rectum is not serious, especially if   hemorrhoids are present.)  6. Vomiting.  7. Weakness or dizziness.  GO DIRECTLY TO THE NEAREST EMERGENCY ROOM IF YOU HAVE ANY OF THE FOLLOWING:      Difficulty breathing              Chills and/or fever over 101 F   Persistent vomiting and/or vomiting blood   Severe abdominal pain   Severe chest pain   Black, tarry stools   Bleeding- more than one tablespoon   Any  other symptom or condition that you feel may need urgent attention  Your doctor recommends these additional instructions:  If any biopsies were taken, your doctors clinic will contact you in 1 to 2   weeks with any results.  - Return patient to [facility] for ongoing care.   - Clear liquid diet for 1 day, then advance as tolerated to full liquid diet   for 1 day. Do not advance past pureed  - Continue present medications.   - Return to referring physician at appointment to be scheduled.  For questions, problems or results please call your physician - Mainor Yoder MD at Work:  [phone].  [facility], EMERGENCY ROOM PHONE NUMBER: [phone]  IF A COMPLICATION OR EMERGENCY SITUATION ARISES AND YOU ARE UNABLE TO REACH   YOUR PHYSICIAN - GO DIRECTLY TO THE EMERGENCY ROOM.  Mainor Yoder MD  7/22/2019 10:34:00 AM  This report has been verified and signed electronically.  [employer]

## 2019-07-22 NOTE — PLAN OF CARE
Problem: Pain Acute  Goal: Optimal Pain Control    Intervention: Optimize Psychosocial Wellbeing  Pt tolerated dilaudid 1mg iv for pain q4hr,

## 2019-07-23 ENCOUNTER — NURSE TRIAGE (OUTPATIENT)
Dept: ADMINISTRATIVE | Facility: CLINIC | Age: 49
End: 2019-07-23

## 2019-07-23 VITALS
BODY MASS INDEX: 26.52 KG/M2 | OXYGEN SATURATION: 96 % | SYSTOLIC BLOOD PRESSURE: 110 MMHG | TEMPERATURE: 98 F | RESPIRATION RATE: 18 BRPM | HEART RATE: 94 BPM | WEIGHT: 175 LBS | HEIGHT: 68 IN | DIASTOLIC BLOOD PRESSURE: 55 MMHG

## 2019-07-23 LAB
ANION GAP SERPL CALC-SCNC: 10 MMOL/L (ref 8–16)
ANISOCYTOSIS BLD QL SMEAR: SLIGHT
BASOPHILS # BLD AUTO: 0.01 K/UL (ref 0–0.2)
BASOPHILS NFR BLD: 0.4 % (ref 0–1.9)
BUN SERPL-MCNC: 7 MG/DL (ref 6–20)
CALCIUM SERPL-MCNC: 9 MG/DL (ref 8.7–10.5)
CHLORIDE SERPL-SCNC: 98 MMOL/L (ref 95–110)
CO2 SERPL-SCNC: 28 MMOL/L (ref 23–29)
CREAT SERPL-MCNC: 0.9 MG/DL (ref 0.5–1.4)
DACRYOCYTES BLD QL SMEAR: ABNORMAL
DIFFERENTIAL METHOD: ABNORMAL
EOSINOPHIL # BLD AUTO: 0 K/UL (ref 0–0.5)
EOSINOPHIL NFR BLD: 0 % (ref 0–8)
ERYTHROCYTE [DISTWIDTH] IN BLOOD BY AUTOMATED COUNT: 17.7 % (ref 11.5–14.5)
EST. GFR  (AFRICAN AMERICAN): >60 ML/MIN/1.73 M^2
EST. GFR  (NON AFRICAN AMERICAN): >60 ML/MIN/1.73 M^2
GLUCOSE SERPL-MCNC: 94 MG/DL (ref 70–110)
HCT VFR BLD AUTO: 31.4 % (ref 40–54)
HGB BLD-MCNC: 10.3 G/DL (ref 14–18)
IMM GRANULOCYTES # BLD AUTO: 0.03 K/UL (ref 0–0.04)
IMM GRANULOCYTES NFR BLD AUTO: 1.2 % (ref 0–0.5)
LYMPHOCYTES # BLD AUTO: 0.4 K/UL (ref 1–4.8)
LYMPHOCYTES NFR BLD: 14.8 % (ref 18–48)
MCH RBC QN AUTO: 28.8 PG (ref 27–31)
MCHC RBC AUTO-ENTMCNC: 32.8 G/DL (ref 32–36)
MCV RBC AUTO: 88 FL (ref 82–98)
MONOCYTES # BLD AUTO: 0.2 K/UL (ref 0.3–1)
MONOCYTES NFR BLD: 9.9 % (ref 4–15)
NEUTROPHILS # BLD AUTO: 1.8 K/UL (ref 1.8–7.7)
NEUTROPHILS NFR BLD: 73.7 % (ref 38–73)
NRBC BLD-RTO: 0 /100 WBC
OVALOCYTES BLD QL SMEAR: ABNORMAL
PLATELET # BLD AUTO: 75 K/UL (ref 150–350)
PLATELET BLD QL SMEAR: ABNORMAL
PMV BLD AUTO: 10.8 FL (ref 9.2–12.9)
POIKILOCYTOSIS BLD QL SMEAR: SLIGHT
POTASSIUM SERPL-SCNC: 4.2 MMOL/L (ref 3.5–5.1)
RBC # BLD AUTO: 3.58 M/UL (ref 4.6–6.2)
SODIUM SERPL-SCNC: 136 MMOL/L (ref 136–145)
WBC # BLD AUTO: 2.43 K/UL (ref 3.9–12.7)

## 2019-07-23 PROCEDURE — 63600175 PHARM REV CODE 636 W HCPCS: Performed by: STUDENT IN AN ORGANIZED HEALTH CARE EDUCATION/TRAINING PROGRAM

## 2019-07-23 PROCEDURE — 80048 BASIC METABOLIC PNL TOTAL CA: CPT

## 2019-07-23 PROCEDURE — 25000003 PHARM REV CODE 250: Performed by: STUDENT IN AN ORGANIZED HEALTH CARE EDUCATION/TRAINING PROGRAM

## 2019-07-23 PROCEDURE — 85025 COMPLETE CBC W/AUTO DIFF WBC: CPT

## 2019-07-23 PROCEDURE — 36415 COLL VENOUS BLD VENIPUNCTURE: CPT

## 2019-07-23 RX ORDER — HYDROMORPHONE HYDROCHLORIDE 1 MG/ML
0.5 INJECTION, SOLUTION INTRAMUSCULAR; INTRAVENOUS; SUBCUTANEOUS EVERY 6 HOURS PRN
Status: DISCONTINUED | OUTPATIENT
Start: 2019-07-23 | End: 2019-07-23

## 2019-07-23 RX ORDER — HEPARIN 100 UNIT/ML
100 SYRINGE INTRAVENOUS
Status: DISCONTINUED | OUTPATIENT
Start: 2019-07-23 | End: 2019-07-23 | Stop reason: HOSPADM

## 2019-07-23 RX ORDER — OXYCODONE HYDROCHLORIDE 10 MG/1
10 TABLET ORAL EVERY 4 HOURS PRN
Status: DISCONTINUED | OUTPATIENT
Start: 2019-07-23 | End: 2019-07-23 | Stop reason: HOSPADM

## 2019-07-23 RX ORDER — ONDANSETRON 8 MG/1
8 TABLET, ORALLY DISINTEGRATING ORAL EVERY 6 HOURS PRN
Qty: 40 TABLET | Refills: 0 | Status: SHIPPED | OUTPATIENT
Start: 2019-07-23

## 2019-07-23 RX ADMIN — HYDROMORPHONE HYDROCHLORIDE 1 MG: 1 INJECTION, SOLUTION INTRAMUSCULAR; INTRAVENOUS; SUBCUTANEOUS at 04:07

## 2019-07-23 RX ADMIN — ONDANSETRON 8 MG: 8 TABLET, ORALLY DISINTEGRATING ORAL at 12:07

## 2019-07-23 RX ADMIN — HYDROMORPHONE HYDROCHLORIDE 0.5 MG: 1 INJECTION, SOLUTION INTRAMUSCULAR; INTRAVENOUS; SUBCUTANEOUS at 09:07

## 2019-07-23 RX ADMIN — ONDANSETRON 8 MG: 8 TABLET, ORALLY DISINTEGRATING ORAL at 05:07

## 2019-07-23 RX ADMIN — GABAPENTIN 800 MG: 400 CAPSULE ORAL at 09:07

## 2019-07-23 RX ADMIN — PANTOPRAZOLE SODIUM 40 MG: 40 TABLET, DELAYED RELEASE ORAL at 09:07

## 2019-07-23 NOTE — PLAN OF CARE
SW attempted to arrange transportation, however, patient does not have transportation benefits with payor. DC transportation was arranged via transportation center for 12 noon. Please be advised.    Elliot Mayberry LMSW

## 2019-07-23 NOTE — PROGRESS NOTES
Discharge note : Pt discharged home alert and oriented xe, skin warm to touch, Pt toleratinf full liquid diet,Pt verbalized understanding of discharge teaching

## 2019-07-23 NOTE — PLAN OF CARE
07/23/19 1435   Post-Acute Status   Post-Acute Authorization Home Health/Hospice   Home Health/Hospice Status Set-up Complete     [facility]  ([phone], f/[phone]). SW confirmed pt is current with [facility] and [facility] is able to resume services, referral manually faxed.    [facility] time by [facility].    No additional sw needs at this time.    Lise Holden, [facility] [phone]

## 2019-07-23 NOTE — PLAN OF CARE
Problem: Adult Inpatient Plan of Care  Goal: Plan of Care Review  Outcome: Ongoing (interventions implemented as appropriate)  Quiet hours. Slept well. VSS. Pain controled with current meds. Safety maintained.

## 2019-07-23 NOTE — SUBJECTIVE & OBJECTIVE
Interval History: No acute events overnight  Continues to have abdominal pain  Denies n/v  Having BM/flatus    Medications:  Continuous Infusions:  Scheduled Meds:   fentaNYL  1 patch Transdermal Q72H    gabapentin  800 mg Oral TID    pantoprazole  40 mg Oral Daily     PRN Meds:acetaminophen, HYDROmorphone, ondansetron, oxyCODONE, promethazine (PHENERGAN) IVPB, sodium chloride 0.9%     Review of patient's allergies indicates:  No Known Allergies  Objective:     Vital Signs (Most Recent):  Temp: 96.8 °F (36 °C) (07/23/19 0425)  Pulse: 94 (07/23/19 0425)  Resp: 20 (07/23/19 0425)  BP: 112/86 (07/23/19 0425)  SpO2: 95 % (07/23/19 0425) Vital Signs (24h Range):  Temp:  [96.2 °F (35.7 °C)-99.4 °F (37.4 °C)] 96.8 °F (36 °C)  Pulse:  [] 94  Resp:  [16-20] 20  SpO2:  [95 %-100 %] 95 %  BP: (101-122)/(64-86) 112/86     Weight: 79.4 kg (175 lb)  Body mass index is 26.61 kg/m².    Intake/Output - Last 3 Shifts       07/21 0700 - 07/22 0659 07/22 0700 - 07/23 0659    P.O. 360 180    I.V. (mL/kg) 650 (8.2) 1700 (21.4)    IV Piggyback 1000     Total Intake(mL/kg) 2010 (25.3) 1880 (23.7)    Urine (mL/kg/hr) 700 (0.4) 300 (0.2)    Stool 0     Total Output 700 300    Net +1310 +1580          Urine Occurrence  2 x    Stool Occurrence 1 x           Physical Exam   Constitutional: He is oriented to person, place, and time. He appears well-developed and well-nourished.   Cardiovascular: Normal rate and regular rhythm.   Pulmonary/Chest: Effort normal. No respiratory distress.   Abdominal: Soft. He exhibits distension (mild). There is no tenderness.   Neurological: He is alert and oriented to person, place, and time.   Psychiatric: He has a normal mood and affect.   Vitals reviewed.      Significant Labs:  Pending    Significant Diagnostics:  None

## 2019-07-23 NOTE — DISCHARGE SUMMARY
[facility]  General Surgery  Discharge Summary      Patient Name: Faye Jordan  MRN: [id number]  Admission Date: 7/18/2019  Hospital Length of Stay: 5 days  Discharge Date and Time:  07/23/2019 10:25 AM  Attending Physician: Roseline Santiago MD   Discharging Provider: Pb Bose MD  Primary Care Provider: Alexis Dowell DO     HPI: 48 y.o. male with PMHx of squamous cell lung cancer s/p resection on 12/13/16, chemotherapy, and XRT, and atrial fibrillation who was transferred from [facility] for higher level of care. He went to the [place] ED on the night of 7/17 with severe epigastric abdominal pain after completing a course of chemo. This pain onset 3 days ago and has occurred intermittently for the past month. The pain is constant and is made worse with eating and movement. The pain is associated with vomiting (multiple episodes yesterday, none today) and diarrhea. He has had a decreased appetite for the past two days secondary to this pain/emesis. In the ED he was found to have obstruction of the distal duodenum due to a pancreatic tail mass, as well as multiple hepatic metastatic lesions and fluid at the right lung base. Upon arrival at [facility], he continues to c/o severe epigastric pain, though he denies nausea/vomiting/diarrhea. He adds that he has lost about 30 pounds in the last 6 weeks.   He was also recently admitted at [facility] from 7/3 to 7/9 for the same symptoms. He was treated medically for the duodenal obstruction with NGT and was later discharged without further intervention.   He denies fever, CP, dyspnea, hematemesis, hematochezia, melena, dysuria.      Initially pT2bN0 stage IIA NSCLC (squamous) s/p right pneumonectomy with + margins followed by adjuvant chemoRT (cis) and adjuvant chemo (carbo/gem).  2/5/19 found to have biopsy proven recurrence (tracheobronchial bx) with PD-L1 of 30%. Bx of liver lesion confirms metastatic  disease.    Procedure(s) (LRB):  EGD (ESOPHAGOGASTRODUODENOSCOPY) (N/A)     Hospital Course: Pt underwent duodenal stent placement with the [facility]. He tolerated the procedure well. Following placement he was tolerating a full liquid diet, pain was controlled and he was ambulating without assistance. D/c home on full liquid diet x72 hr which can then be advanced to mechanical soft.    Consults:   Consults (From admission, onward)        Status Ordering Provider     Inpatient consult to [facility] ([facility])  Once     Provider:  (Not yet assigned)    Completed MAURICE GÓMEZ     IP consult to case management  Once     Provider:  (Not yet assigned)    Completed ROSELINE SANTIAGO          Significant Diagnostic Studies: See chart    Pending Diagnostic Studies:     Procedure Component Value Units Date/Time    FL Flouro Usage [[id number]] Resulted:  07/22/19 0930    Order Status:  Sent Lab Status:  In process Updated:  07/22/19 1026        Final Active Diagnoses:    Diagnosis Date Noted POA    PRINCIPAL PROBLEM:  Duodenal obstruction [K31.5] 07/18/2019 Yes      Problems Resolved During this Admission:      Discharged Condition: fair    Disposition: Home or Self Care    Follow Up:  Follow-up Information     Roseline Santiago MD.    Specialty:  General Surgery  Why:  As needed  Contact information:  [address] [address] [phone]                 Patient Instructions:      Diet full liquid     Activity as tolerated     Medications:  Reconciled Home Medications:      Medication List      START taking these medications    ondansetron 8 MG Tbdl  Commonly known as:  ZOFRAN-ODT  Dissolve 1 tablet (8 mg total) by mouth every 6 (six) hours as needed.        CONTINUE taking these medications    gabapentin 800 MG tablet  Commonly known as:  NEURONTIN  Take 1 tablet (800 mg total) by mouth 3 (three) times daily.     HYDROcodone-acetaminophen  mg  per tablet  Commonly known as:  NORCO     MUCINEX 600 mg 12 hr tablet  Generic drug:  guaiFENesin     omeprazole 40 MG capsule  Commonly known as:  PRILOSEC            Pb Bose MD  General Surgery  [facility]

## 2019-07-23 NOTE — PHYSICIAN QUERY
PT Name: Faye Jordan  MR #: [id number]    Physician Query Form - Nutrition Clarification     CDS/: Brandy E Capley               Contact information: [email or web address]    This form is a permanent document in the medical record.     Query Date: July 23, 2019    By submitting this query, we are merely seeking further clarification of documentation.. Please utilize your independent clinical judgment when addressing the question(s) below.    The Medical record contains the following:   Indicators  Supporting Clinical Findings Location in Medical Record    % of Estimated Energy Intake over a time frame from p.o., TF, or TPN     X Weight Status over a time frame He adds that he has lost about 30 pounds in the last 6 weeks.    H&P 7/19, filed 7/21    Subcutaneous Fat and/or Muscle Loss      Fluid Accumulation or Edema      Reduced  Strength     X Wt / BMI / Usual Body Weight Weight: 79.4 kg (175 lb)  Body mass index is 26.61 kg/m²       Delayed Wound Healing / Failure to Thrive     X Acute or Chronic Illness He went to the [place] ED on the night of 7/17 with severe epigastric abdominal pain after completing a course of chemo. This pain onset 3 days ago and has occurred intermittently for the past month. The pain is constant and is made worse with eating and movement. The pain is associated with vomiting (multiple episodes yesterday, none today) and diarrhea. He has had a decreased appetite for the past two days secondary to this pain/emesis. In the ED he was found to have obstruction of the distal duodenum due to a pancreatic tail mass, as well as multiple hepatic metastatic lesions and fluid at the right lung base. [place] [facility] continues to c/o severe epigastric pain, though he denies nausea/vomiting/diarrhea. He adds that he has lost about 30 pounds in the last 6 weeks.   He was also recently admitted at [facility] from 7/3 to 7/9 for the same symptoms. He was treated medically for the  duodenal obstruction with NGT and was later discharged without further intervention.     Duodenal obstruction  Faye Jordan is a 48 y.o. male with PMHx of squamous cell lung cancer s/p resection on 12/13/16, chemotherapy, and XRT, and atrial fibrillation who was transferred from [facility] for higher level of care. His squamous cell lung cancer has biopsy-proven metastases to his liver and he has also developed a mass of the tail of his pancreas that is causing duodenal obstruction.     Initially pT2bN0 stage IIA NSCLC (squamous) s/p right pneumonectomy with + margins followed by adjuvant chemoRT (cis) and adjuvant chemo (carbo/gem).  2/5/19 found to have biopsy proven recurrence (tracheobronchial bx) with PD-L1 of 30%. Bx of liver lesion confirms metastatic disease    Constitutional:   Positive for appetite change (Decreased) and unexpected weight change (30 lbs in 6 weeks).     Gastrointestinal:   Positive for abdominal distention, abdominal pain, diarrhea, nausea and vomiting. Negative for constipation.    He appears well-developed and well-nourished    He is nauseated with meals.  He cannot keep food down.  He has food aversion.     H&P 7/19, filed 7/21                                                          Gastroenterology consult 7/20    Medication     X Treatment Findings:       The esophagus was normal.       The stomach was normal.       An acquired malignant-appearing, intrinsic severe stenosis was found        in the fourth portion of the duodenum and was non-traversed. This        was stented with a 22 mm x 9 cm WallFlex stent under fluoroscopic guidance. Upper GI endoscopy 7/22    Other       AND / ASPEN Clinical Characteristics (October 2011)  A minimum of two characteristics is recommended for diagnosing either moderate or severe malnutrition   Mild Malnutrition Moderate Malnutrition Severe Malnutrition   Energy Intake from p.o., TF or TPN. < 75% intake of estimated energy needs for  less than 7 days < 75% intake of estimated energy needs for greater than 7 days < 50% intake of estimated energy needs for > 5 days   Weight Loss 1-2% in 1 month  5% in 3 months  7.5% in 6 months  10% in 1 year 1-2 % in 1 week  5% in 1 month  7.5% in 3 months  10% in 6 months  20% in 1 year > 2% in 1 week  > 5% in 1 month  > 7.5% in 3 months  > 10% in 6 months  > 20% in 1 year   Physical Findings     None *Mild subcutaneous fat and/or muscle loss  *Mild fluid accumulation  *Stage II decubitus  *Surgical wound or non-healing wound *Mod/severe subcutaneous fat and/or muscle loss  *Mod/severe fluid accumulation  *Stage III or IV decubitus  *Non-healing surgical wound     Provider, please specify diagnosis or diagnoses associated with above clinical findings.    [  ] Mild Protein-Calorie Malnutrition   [ x ] Moderate Protein-Calorie Malnutrition   [  ] Abnormal Weight Loss   [  ] Other Nutritional Diagnosis (please specify):    [  ] Other:    [  ] Clinically Undetermined       Please document in your progress notes daily for the duration of treatment until resolved and include in your discharge summary.

## 2019-07-23 NOTE — ASSESSMENT & PLAN NOTE
Faye Jordan is a 48 y.o. male with PMHx of squamous cell lung cancer s/p pneumonectomy in 12/13/16, chemotherapy, and XRT, and atrial fibrillation who was transferred from [facility] for higher level of care. His squamous cell lung cancer has biopsy-proven metastases to his liver and he has also developed a mass of the tail of his pancreas that is causing a duodenal obstruction. S/p duodenal stent placement on 7/22    - Full liquid diet per GI  - Pain meds prn, antiemetics prn  - Daily labs    Plan to d/c home today; Will need to arrange transportation with social work

## 2019-07-23 NOTE — PLAN OF CARE
Patient discharging home today with resumption of serviced with [facility].  Patient re-scheduled his own PCP appointment 9/16/2019.  Will follow up with Dr Santiago as needed.       07/23/19 1249   Final Note   Assessment Type Final Discharge Note   Anticipated Discharge Disposition [facility]   Hospital Follow Up  Appt(s) scheduled? Yes   Right Care Referral Info   Post Acute Recommendation Home-care

## 2019-07-23 NOTE — PLAN OF CARE
[facility]    HOME HEALTH ORDERS  FACE TO FACE ENCOUNTER    Patient Name: Faye Jordan  YOB: 1970    PCP: Alexis Dowell DO   PCP Address: [address] / [place] [address]  PCP Phone Number: [phone]  PCP Fax: [phone]    Encounter Date: 07/23/2019    Admit to Home Health    Diagnoses:  Active Hospital Problems    Diagnosis  POA    Duodenal obstruction [K31.5]  Yes      Resolved Hospital Problems   No resolved problems to display.       Future Appointments   Date Time Provider Department Center   8/12/2019  2:00 PM [facility]           I have seen and examined this patient face to face today. My clinical findings that support the need for the home health skilled services and home bound status are the following:  Medical restrictions requiring assistance of another human to leave home due to Unstable ambulation and metastatic carcinoma.    Allergies:Review of patient's allergies indicates:  No Known Allergies    Diet: full liquids x72 hours followed by mechanical soft    Activities: activity as tolerated    Nursing:   SN to complete comprehensive assessment including routine vital signs. Instruct on disease process and s/s of complications to report to MD. Review/verify medication list sent home with the patient at time of discharge  and instruct patient/caregiver as needed. Frequency may be adjusted depending on start of care date. If patient has enteral feeding tube (NG, PEG, J-tube, G-tube), flush tube before and after feeding and/or medication administration with 20-30 mL of water.    Notify MD if SBP > 160 or < 90; DBP > 90 or < 50; HR > 120 or < 50; Temp > 101; Other:          CONSULTS:     to evaluate for community resources/long-range planning.  Aide to provide assistance with personal care, ADLs, and vital signs.    MISCELLANEOUS CARE:  -labs per previously established plan of care; re-activate home health  orders which were in place prior to hospitalization    WOUND CARE ORDERS  no      Medications: Review discharge medications with patient and family and provide education.      Current Discharge Medication List      CONTINUE these medications which have NOT CHANGED    Details   gabapentin (NEURONTIN) 800 MG tablet Take 1 tablet (800 mg total) by mouth 3 (three) times daily.  Qty: 270 tablet, Refills: 1    Associated Diagnoses: Progressive focal motor weakness; Myelopathy      HYDROcodone-acetaminophen (NORCO)  mg per tablet       MUCINEX 600 mg 12 hr tablet       omeprazole (PRILOSEC) 40 MG capsule              I certify that this patient is confined to his home and needs intermittent skilled nursing care.

## 2019-07-23 NOTE — PLAN OF CARE
07/23/19 1120   Post-Acute Status   Post-Acute Authorization Home Health/Hospice   Home Health/Hospice Status Referrals Sent  (Resume services with [facility])

## 2019-07-24 ENCOUNTER — TELEPHONE (OUTPATIENT)
Dept: SURGERY | Facility: CLINIC | Age: 49
End: 2019-07-24

## 2019-07-24 ENCOUNTER — TELEPHONE (OUTPATIENT)
Dept: ENDOSCOPY | Facility: HOSPITAL | Age: 49
End: 2019-07-24

## 2019-07-24 NOTE — TELEPHONE ENCOUNTER
----- Message from Aubrey Rios sent at 7/24/2019  9:53 AM CDT -----  Needs Advice    Reason for call: Pt is asking to speak w/ the nurse, pt said it's regarding the note that was given yesterday         Communication Preference: [phone]    Additional Information:

## 2019-07-24 NOTE — TELEPHONE ENCOUNTER
Discussed with Dr Yoder and informed patient that he should only eat foods that can pass through a colander. Stressed pureed.    Francie-He wants to know if there is a list of foods that he can have. I tried to explain, but he wants something that lays out the specific foods.  Can you help?

## 2019-07-24 NOTE — TELEPHONE ENCOUNTER
Called pt to inquire if all his questions were answered regarding his diet. He said he spoke with someone, I believe the dietician and she will send him a list of the foods he can eat.

## 2019-07-26 ENCOUNTER — TELEPHONE (OUTPATIENT)
Dept: SPINE | Facility: CLINIC | Age: 49
End: 2019-07-26

## 2019-07-29 ENCOUNTER — TELEPHONE (OUTPATIENT)
Dept: GASTROENTEROLOGY | Facility: CLINIC | Age: 49
End: 2019-07-29

## 2019-07-29 NOTE — TELEPHONE ENCOUNTER
"Nutrition Assessment for Medical Nutrition Therapy  Refusal to eat purees and liquids " I want regular food!"   Referring professional: Dr Yoder     Reason for Phone call  :   Duodenal stent for obstruction (palliative for stenosing neoplasm ; Dr Yoder specifying purees only ); Hx Lung Cancer (s/p lobectomy and chemo 2016 ); Metastasis to pancreas causing obstruction; and Weight Loss (25# decrease in 6 months )    Pertinent Social History: Disabled, does his own cooking     Medical History:     Past Medical History:   Diagnosis Date    Atrial fibrillation with rapid ventricular response 12/13/2016    Cancer     right lung    History of cancer chemotherapy     Lumbar back pain     Neuralgia     finger tips/agnieszka. legs    Pneumonia 11/2016    SOB (shortness of breath) on exertion        Past Surgical History:   Procedure Laterality Date    BRONCHOSCOPY-OPERATIVE,FLEXIBLE N/A 12/13/2016    [facility]ND F[facility]    DISSECTION-LYMPH NODE Right 12/13/2016    [facility]ND [facility]LR    EGD (ESOPHAGOGASTRODUODENOSCOPY) N/A 7/22/2019    Per[facility] Mainor [facility] at [facility] ENDO (2ND FLR)    [facility]    LUNG LOBECTOMY Right     PNEUMONECTOMY Right 12/13/2016    [facility]ND FLR    PORTACATH PLACEMENT Left     THORACOTOMY-POSTEROLATERAL Right 12/13/2016    Per[facility] at [facility] OR 2ND FLR          Pertinent Medications:   Current Outpatient Medications on File Prior to Visit   Medication Sig Dispense Refill    gabapentin (NEURONTIN) 800 MG tablet Take 1 tablet (800 mg total) by mouth 3 (three) times daily. 270 tablet 1    HYDROcodone-acetaminophen (NORCO)  mg per tablet       MUCINEX 600 mg 12 hr tablet       omeprazole (PRILOSEC) 40 MG capsule       ondansetron (ZOFRAN-ODT) 8 MG TbDL Dissolve 1 tablet (8 mg total) by mouth every 6 (six) hours as " needed. 40 tablet 0     No current facility-administered medications on file prior to visit.        Vitamins/Supplements/Herbs: Had consumed Ensure during chemotherapy     Food intolerances or allergies:   Review of patient's allergies indicates:  No Known Allergies    Labs:      7/22/2019  8:03 AM - Leobardo, Soft Lab Interface     Component Value Flag Ref Range Units Status   Sodium 134  Low   136 - 145 mmol/L Final   Potassium 3.4  Low   3.5 - 5.1 mmol/L Final   Chloride 97   95 - 110 mmol/L Final   CO2 23   23 - 29 mmol/L Final   Glucose 84   70 - 110 mg/dL Final   BUN, Bld 9   6 - 20 mg/dL Final   Creatinine 0.9   0.5 - 1.4 mg/dL Final   Calcium 8.8   8.7 - 10.5 mg/dL Final   Total Protein 6.0   6.0 - 8.4 g/dL Final   Albumin 2.9  Low   3.5 - 5.2 g/dL Final   Total Bilirubin 0.7   0.1 - 1.0 mg/dL Final   Comment:   For infants and newborns, interpretation of results should be based   on gestational age, weight and in agreement with clinical   observations.   Premature Infant recommended reference ranges:   Up to 24 hours.............<8.0 mg/dL   Up to 48 hours............<12.0 mg/dL   3-5 days..................<15.0 mg/dL   6-29 days.................<15.0 mg/dL    Alkaline Phosphatase 85   55 - 135 U/L Final   AST 30   10 - 40 U/L Final   ALT 60  High   10 - 44 U/L Final   Anion Gap 14   8 - 16 mmol/L Final   eGFR if  >60.0   >60 mL/min/1.73 m^2 Final   eGFR if non African American >60.0   >60 mL/min/1.73 m^2 Final   Comment:   Calculation used to obtain the estimated glomerular filtration   rate (eGFR) is the CKD-EPI equation.    Narrative     Collection has been rescheduled by VS1 at 07/22/2019 05:44 Reason:   Unable to collect attempted two sticks but was unsuccessful. Nurse   Grace notified.                Last 3 Weights:   Wt Readings from Last 3 Encounters:   07/19/19 79.4 kg (175 lb)   07/18/19 79.4 kg (175 lb)   01/29/19 89.9 kg (198 lb 3.1 oz)       Weight status: consistently decreasing 25#  weight loss in six months     Calculated Calorie needs: 2200 calories MSJ X 1.3    Calculated Protein needs: 95 grams (1.2grams per kg)     Physical Activity : limited - using walker for stability     Eating Behaviors: hx of consuming sandwiches, boudin, steaks, --- now consuming grits and instant oatmeal, mashed potatoes but notes he dislikes soft mushy foods . He asks about macaroni and cheese; beans, eggs, rice .         Nutrition History     Meal patterns: 2-3 meals daily  Breakfast: grits   Lunch: mashed potatoes   Dinner: soup   Bowel Function : reports had a bowel movement today.       Meal preparation/shopping: self    Nutrition beverages: mainly drinks water, drinks juices, drinks caffeine, had been consuming Ensure plus but is tired of it .      Dining out: dines out with relative weekly and wants to know what to order   restaurants1-2 times per week  Smoking/alcohol:  Social History     Tobacco Use    Smoking status: Former Smoker     Packs/day: 1.50     Years: 27.00     Pack years: 40.50     Types: Cigarettes    Smokeless tobacco: Former User    Tobacco comment: quit 2012   Substance Use Topics    Alcohol use: No       Nutrition Diagnosis: Altered GI function related to Duodenal obstruction /recurrent cancer as evidenced by weight loss of 25# in 6 months.       Motivation to change: high    Patient Goal: Wants to be able to eat regular food and wants to negotiate cutting food up into small pieces and chewing thoroughly .       MNT Recommendations/Interventions/Goals:  Discussed the nature of the stent and needing to avoid any foods which are not pureed/liquid consistency due to risk for recurrent obstruction     Discussion with patient included:   Use of magic bullet to reduce favorite foods to texture which will not cause blockage .  Use of slow cooker to make foods easier to mash > puree  Use of evaporated milk,jar gravy to increase caloric content of foods consumed.   Sonic - lives nearby -  suggested milkshakes/smoothies without pieces of fruit, candy etc        Comprehensionpoor, patient is not receptive to information which does not allow regular food   Patient demonstrated understanding: patient voiced frustration over restriction of food texture .       Will send Information on high calorie liquids and purees ; meal replacements   [facility] GI Nutrition : high calorie liquids ; pureed diet post stent placement ; magic bullet ; meal replacements ; sonic menu for drinks and slushes   Nutrition follow-up:  will follow up as needed    Counseling time: 30 Minutes

## 2019-08-05 ENCOUNTER — TELEPHONE (OUTPATIENT)
Dept: GASTROENTEROLOGY | Facility: CLINIC | Age: 49
End: 2019-08-05

## 2019-08-05 NOTE — TELEPHONE ENCOUNTER
Nutrition Assessment for Medical Nutrition Therapy  Follow up phone call ; written information mailed to patient for pureed/liquid diet   Referring professional: Dr Mainor Yoder     Reason for Phone call  :   Duodenal obstruction (S/P  stent placement /pureed/liquid diet )    Pertinent Social History: prepares own meals ; since we last spoke, he has obtained a slow cooker from a friend and is using it to prepare all foods with added moisture . He also has Ensure supplement provided by [facility] which he consumes 1-2 per day     Medical History:     Past Medical History:   Diagnosis Date    Atrial fibrillation with rapid ventricular response 12/13/2016    Cancer     right lung    History of cancer chemotherapy     Lumbar back pain     Neuralgia     finger tips/agnieszka. legs    Pneumonia 11/2016    SOB (shortness of breath) on exertion        Past Surgical History:   Procedure Laterality Date    BRONCHOSCOPY-OPERATIVE,FLEXIBLE N/A 12/13/2016    [facility] 2ND FLR    DISSECTION-LYMPH NODE Right 12/13/2016    [facility] OR 2ND FLR    EGD (ESOPHAGOGASTRODUODENOSCOPY) N/A 7/22/2019    [facility]formed [facility] at [facility] ENDO (2ND FLR)    [facility]  [facility]    at [facility]    LUNG LOBECTOMY Right     PNEUMONECTOMY Right 12/13/2016    [facility] 2ND FLR    PORTACATH PLACEMENT Left     THORACOTOMY-POSTEROLATERAL Right 12/13/2016    Performed by [facility] at [facility] OR 2ND FLR          Pertinent Medications:   Current Outpatient Medications on File Prior to Visit   Medication Sig Dispense Refill    gabapentin (NEURONTIN) 800 MG tablet Take 1 tablet (800 mg total) by mouth 3 (three) times daily. 270 tablet 1    HYDROcodone-acetaminophen (NORCO)  mg per tablet       MUCINEX 600 mg 12 hr tablet       omeprazole (PRILOSEC) 40 MG capsule       ondansetron (ZOFRAN-ODT) 8 MG TbDL  "Dissolve 1 tablet (8 mg total) by mouth every 6 (six) hours as needed. 40 tablet 0     No current facility-administered medications on file prior to visit.        Vitamins/Supplements/Herbs: Ensure 1-3 cans per day     Food intolerances or allergies:   Review of patient's allergies indicates:  No Known Allergies    Labs:               Last 3 Weights:   Wt Readings from Last 3 Encounters:   07/19/19 79.4 kg (175 lb)   07/18/19 79.4 kg (175 lb)   01/29/19 89.9 kg (198 lb 3.1 oz)     BMI: 26.6     Weight status: consistently decreasing 12 % decrease in 6 months     Calculated Calorie needs: 2200 ( 1.3 stress factor)   Calculated Protein needs:  grams (1.2 grams per kg    Physical Activity : disabled; uses walker for ambulation     Eating Behaviors:  Physical ability to complete tasks for meal preparation  2 - Able to swallow only semi-solid foods  Nutrition History   Reports hx of consuming sandwiches, salads, stews, casseroles , gumbos   Notes taste changes since undergoing chemotherapy and increased mucous production . Notes he tried to eat boudin a few days ago without the casing and "it hurt my stomach  after I swallowed it. "  Meal patterns: 6 small meals daily  Breakfast: grits no longer taste appealing with chemo ; will consume potted meat with mashed potatoes   Snack: ensure; applesauce , snow cone , banana mashed   Lunch: Gumbo broth with potato salad- mashed   Dinner: Spam with tomato sauce and mashed spaghetti  Beverages ; has eliminated coca cola and has begun drinking juices to assist with bowel function and to help with mucous production ; significant milk intake 4-6 glasses per day or Ensure   Bowel Function : reports bowel movement every 2-3 days     Meal preparation/shopping: self    Dining out: none    Smoking/alcohol:  Social History     Tobacco Use    Smoking status: Former Smoker     Packs/day: 1.50     Years: 27.00     Pack years: 40.50     Types: Cigarettes    Smokeless tobacco: Former " User    Tobacco comment: quit 2012   Substance Use Topics    Alcohol use: No       Nutrition Diagnosis: Altered GI function related to Duodenal Obstruction s/p stent placement as evidenced by diet recall of foods tolerated       Motivation to change: high    Patient Goal:   1. Adequate calories to feel satisfied in a form appealing and easy to prepare.  2. Prevention of further obstructions        MNT Recommendations/Interventions/Goals:  · Liquid/pureed forms of foods   · Stressed avoiding seeds, stringy fibers, meats, hulls of beans unless modified to pureed form.  · He is resistant to using a  but notes he will chop by hand foods into tiny pieces /mash with a fork and be sure to consume in a gravy      Discussion with patient included:   Above , stressed consuming 3-4 Ensures/MiIk  per day to obtain adequate protein.  Encouraged use of crock pot for all meals and use of  for meats and beans        Comprehensiongood   Patient demonstrated understanding: stated foods which are pureed and how to prepare     Nutrition follow-up:  provided contact number should he have additional questions     Counseling time: 45 Minutes

## 2019-08-06 DIAGNOSIS — M50.30 DEGENERATIVE DISC DISEASE, CERVICAL: ICD-10-CM

## 2019-08-06 DIAGNOSIS — G95.9 MYELOPATHY: Primary | ICD-10-CM

## 2019-08-12 ENCOUNTER — TELEPHONE (OUTPATIENT)
Dept: ENDOSCOPY | Facility: HOSPITAL | Age: 49
End: 2019-08-12

## 2019-08-12 ENCOUNTER — TELEPHONE (OUTPATIENT)
Dept: GASTROENTEROLOGY | Facility: CLINIC | Age: 49
End: 2019-08-12

## 2019-08-12 NOTE — TELEPHONE ENCOUNTER
----- Message from Aubreykonstantin Rios sent at 6/13/2018 10:46 AM CDT -----  Patient Requesting Sooner Appointment.     Reason for sooner appt.: consult-spinal pain. Pt said he needs to complete this before the winter time  When is the first available appointment? 10/29/18. Pt already scheduled.  Communication Preference: [phone], ext [phone] or [phone]  Additional Information:    
Spoke with the Pt about his scheduled appt .advised pt that he needs an MRI and or referral to the dept. He stated that he will visit his Primary on Friday 06/15/18and will request a referral than . advise the Pt to call back when he fines his MRI Pt stated he will call back next Tuesday to reschedule   
Detail Level: Detailed

## 2019-08-12 NOTE — TELEPHONE ENCOUNTER
----- Message from Germania Horne sent at 8/12/2019  2:28 PM CDT -----  Contact: pt#[phone]  Needs Advice    Reason for call:Pt wants to speak with dietitian        Communication Preference:call    Additional Information:

## 2019-08-12 NOTE — TELEPHONE ENCOUNTER
Patient with additional questions relati. Meat also ve to allowed foods on pureed diet.   He notes he has gained weight in the past week and has not had any problems with foods in pureed form. He notes he has also experimented with pancakes to which milk and syrup are added to soft pudding consistency.     He is asking about gumbo, chili with beans noodles with cream sauce, stews, cooked vegetables, etc.    We discussed at length the importance of moisture as sauce or gravy and the need for meats to be cooked in crockpot and pureed or use of products such as potted meat which is already pureed.     Discussion about beans and the need for hulls/skins to be removed and pureed .   Items such as ice cream , snow balls, are fine

## 2019-09-03 DIAGNOSIS — R53.1 PROGRESSIVE FOCAL MOTOR WEAKNESS: ICD-10-CM

## 2019-09-03 DIAGNOSIS — G95.9 MYELOPATHY: ICD-10-CM

## 2019-09-03 RX ORDER — GABAPENTIN 800 MG/1
800 TABLET ORAL 3 TIMES DAILY
Qty: 270 TABLET | Refills: 3 | Status: SHIPPED | OUTPATIENT
Start: 2019-09-03 | End: 2020-09-02

## 2019-09-03 NOTE — TELEPHONE ENCOUNTER
----- Message from Aubrey Rios sent at 9/3/2019  9:57 AM CDT -----  Contact: pt @ [phone]  Rx Refill/Request     Is this a Refill or New Rx: Refill   Rx Name and Strength: gabapentin (NEURONTIN) 800 MG tablet   Preferred Pharmacy with phone number: see below  Communication Preference: [phone]  Additional Information: Pt is asking for a call back once script has been sent to pharmacy    [id number] - [place], [place] [address] AT [place] [address] [address] [address]  Phone: [phone] Fax: [phone]

## 2019-09-23 ENCOUNTER — TELEPHONE (OUTPATIENT)
Dept: GASTROENTEROLOGY | Facility: CLINIC | Age: 49
End: 2019-09-23

## 2019-09-23 NOTE — TELEPHONE ENCOUNTER
----- Message from Marcie Stubbs sent at 9/23/2019 10:38 AM CDT -----  Contact: self @ [phone]  Would like to discuss his food options.  Pls call.

## 2019-09-23 NOTE — TELEPHONE ENCOUNTER
Nutrition Assessment for Medical Nutrition Therapy    Referring professional: Dr Yoder / patient now receiving all care near [place] due to lack of transportation .    Reason for Phone call  : reports good appetite but foods which require little or no prep /use of hot plate are not texture appropriate ( sandwiches/chcken ) . Desires some of his favorites ( boiled shrimp, boudin , ribs)   Weight loss   Inability to gain weight   duodenal obstruction s/p stent placement (limited food texture - no kitchen utensils for pureeing, crock pot ); Weight Loss (reported by patient - consuming Ensure and foods such as potted meat , etc as he has only hot plate and small refrige ); and short-term memory deficit (due to pain meds )    Pertinent Social History: Living in room /hotel with limited cooking facilities ( hot plate and small fridge - no freezer) . Anticipates moving to home in near future with kitchen . Wants menus due to short term memory deficit.     Medical History:     Past Medical History:   Diagnosis Date    Atrial fibrillation with rapid ventricular response 12/13/2016    Cancer     right lung    History of cancer chemotherapy     Lumbar back pain     Neuralgia     finger tips/agnieszka. legs    Pneumonia 11/2016    SOB (shortness of breath) on exertion        Past Surgical History:   Procedure Laterality Date    BRONCHOSCOPY-OPERATIVE,FLEXIBLE N/A 12/13/2016    [facility]    DISSECTION-LYMPH NODE Right 12/13/2016    [facility]    EGD (ESOPHAGOGASTRODUODENOSCOPY) N/A 7/22/2019    [facility] ENDO (2ND FLR)    LUNG BIOPSY  11/2016    at [facility] facility    LUNG LOBECTOMY Right     PNEUMONECTOMY Right 12/13/2016    [facility]    PORTACATH PLACEMENT Left     THORACOTOMY-POSTEROLATERAL Right 12/13/2016    [facility]  2ND [facility]          Pertinent Medications:   Current Outpatient Medications on File Prior to Visit   Medication Sig Dispense Refill    gabapentin (NEURONTIN) 800 MG tablet Take 1 tablet (800 mg total) by mouth 3 (three) times daily. 270 tablet 3    HYDROcodone-acetaminophen (NORCO)  mg per tablet       MUCINEX 600 mg 12 hr tablet       omeprazole (PRILOSEC) 40 MG capsule       ondansetron (ZOFRAN-ODT) 8 MG TbDL Dissolve 1 tablet (8 mg total) by mouth every 6 (six) hours as needed. 40 tablet 0     No current facility-administered medications on file prior to visit.        Food intolerances or allergies: refer to previous note   Review of patient's allergies indicates:  No Known Allergies    Labs:   7/19/2019  3:17 AM - Leobardo, Soft Lab Interface     Component Value Flag Ref Range Units Status   Sodium 139   136 - 145 mmol/L Final   Potassium 4.2   3.5 - 5.1 mmol/L Final   Chloride 104   95 - 110 mmol/L Final   CO2 27   23 - 29 mmol/L Final   Glucose 107   70 - 110 mg/dL Final   BUN, Bld 14   6 - 20 mg/dL Final   Creatinine 0.8   0.5 - 1.4 mg/dL Final   Calcium 8.7   8.7 - 10.5 mg/dL Final   Total Protein 5.7  Low   6.0 - 8.4 g/dL Final   Albumin 3.1  Low   3.5 - 5.2 g/dL Final   Total Bilirubin 0.4   0.1 - 1.0 mg/dL Final   Comment:   For infants and newborns, interpretation of results should be based   on gestational age, weight and in agreement with clinical   observations.   Premature Infant recommended reference ranges:   Up to 24 hours.............<8.0 mg/dL   Up to 48 hours............<12.0 mg/dL   3-5 days..................<15.0 mg/dL   6-29 days.................<15.0 mg/dL    Alkaline Phosphatase 79   55 - 135 U/L Final   AST 25   10 - 40 U/L Final   ALT 39   10 - 44 U/L Final   Anion Gap 8   8 - 16 mmol/L Final   eGFR if  >60.0   >60 mL/min/1.73 m^2 Final   eGFR if non African American >60.0   >60 mL/min/1.73 m^2 Final                Last 3 Weights:   Wt Readings from Last 3 Encounters:    07/19/19 79.4 kg (175 lb)   07/18/19 79.4 kg (175 lb)   01/29/19 89.9 kg (198 lb 3.1 oz)     BMI:     Weight status: Now receives medical care near [place]     Eating Behavior : reports tendency to eat meals fast and overload throat and stomach sometimes triggering vomiting.   Nutrition History   See previous note   Meal patterns:   Breakfast: canned pears, Ensure ,   Lunch: potted meat, canned ravioli   Dinner: Mashed potatoes , tuna fish , pudding   Bowel Function : consuming more koolaid and sweetened drinks rather than water     Notes one formed bowel movement every other day due to pain meds exacerbating constipation   Meal preparation/shopping: self    Nutrition beverages: mainly drinks water, mainly drinks regular beverages, drinks carbonated drinks, using sugar as sweetener     Dining out: rare except for family or friend's  houses     Smoking/alcohol:  Social History     Tobacco Use    Smoking status: Former Smoker     Packs/day: 1.50     Years: 27.00     Pack years: 40.50     Types: Cigarettes    Smokeless tobacco: Former User    Tobacco comment: quit 2012   Substance Use Topics    Alcohol use: No       Patient Goal: Increased calorie intake in a form that will not trigger blockage with current stent in place .       MNT Recommendations/Interventions/Goals:  Crock pot cooking with mini  ( magic Bullet)     Discussion with patient included:   Methods of cooking, frozen dinners, canned foods which can be mashed       Comprehensionfair  due to pain meds which impact retention of what is discussed .   Patient demonstrated understanding: Will mail printed information for reference     Nutrition follow-up:  Has phone contact     Counseling time: 1 Hour

## 2019-12-04 ENCOUNTER — TELEPHONE (OUTPATIENT)
Dept: GASTROENTEROLOGY | Facility: CLINIC | Age: 49
End: 2019-12-04

## 2025-02-18 NOTE — PROGRESS NOTES
Health Maintenance       Shingles Vaccine (1 of 2)  Overdue since 10/13/2014    Respiratory Syncytial Virus (RSV) Vaccine 60+ (1 - 1-dose 75+ series)  Never done    Microalbumin Ratio (Yearly)  Overdue since 7/11/2023    COVID-19 Vaccine (4 - 2024-25 season)  Overdue since 9/1/2024           Following review of the above:  Patient is not proceeding with: COVID-19, Respiratory Syncytial Virus (RSV), and Shingles    Note: Refer to final orders and clinician documentation.       [facility]  General Surgery  Progress Note    Subjective:     History of Present Illness:  Faye Jordan is a 48 y.o. male with PMHx of squamous cell lung cancer s/p resection on 12/13/16, chemotherapy, and XRT, and atrial fibrillation who was transferred from [facility] for higher level of care. He went to the [facility] ED on the night of 7/17 with severe epigastric abdominal pain after completing a course of chemo. This pain onset 3 days ago and has occurred intermittently for the past month. The pain is constant and is made worse with eating and movement. The pain is associated with vomiting (multiple episodes yesterday, none today) and diarrhea. He has had a decreased appetite for the past two days secondary to this pain/emesis. In the ED he was found to have obstruction of the distal duodenum due to a pancreatic tail mass, as well as multiple hepatic metastatic lesions and fluid at the right lung base. Upon arrival at [facility], he continues to c/o severe epigastric pain, though he denies nausea/vomiting/diarrhea. He adds that he has lost about 30 pounds in the last 6 weeks.   He was also recently admitted at [facility] from 7/3 to 7/9 for the same symptoms. He was treated medically for the duodenal obstruction with NGT and was later discharged without further intervention.   He denies fever, CP, dyspnea, hematemesis, hematochezia, melena, dysuria.     Initially pT2bN0 stage IIA NSCLC (squamous) s/p right pneumonectomy with + margins followed by adjuvant chemoRT (cis) and adjuvant chemo (carbo/gem).  2/5/19 found to have biopsy proven recurrence (tracheobronchial bx) with PD-L1 of 30%. Bx of liver lesion confirms metastatic disease.              Post-Op Info:  Procedure(s) (LRB):  EGD (ESOPHAGOGASTRODUODENOSCOPY) (N/A)   1 Day Post-Op     Interval History: No acute events overnight  Continues to have abdominal pain  Denies n/v  Having BM/flatus    Medications:  Continuous  Infusions:  Scheduled Meds:   fentaNYL  1 patch Transdermal Q72H    gabapentin  800 mg Oral TID    pantoprazole  40 mg Oral Daily     PRN Meds:acetaminophen, HYDROmorphone, ondansetron, oxyCODONE, promethazine (PHENERGAN) IVPB, sodium chloride 0.9%     Review of patient's allergies indicates:  No Known Allergies  Objective:     Vital Signs (Most Recent):  Temp: 96.8 °F (36 °C) (07/23/19 0425)  Pulse: 94 (07/23/19 0425)  Resp: 20 (07/23/19 0425)  BP: 112/86 (07/23/19 0425)  SpO2: 95 % (07/23/19 0425) Vital Signs (24h Range):  Temp:  [96.2 °F (35.7 °C)-99.4 °F (37.4 °C)] 96.8 °F (36 °C)  Pulse:  [] 94  Resp:  [16-20] 20  SpO2:  [95 %-100 %] 95 %  BP: (101-122)/(64-86) 112/86     Weight: 79.4 kg (175 lb)  Body mass index is 26.61 kg/m².    Intake/Output - Last 3 Shifts       07/21 0700 - 07/22 0659 07/22 0700 - 07/23 0659    P.O. 360 180    I.V. (mL/kg) 650 (8.2) 1700 (21.4)    IV Piggyback 1000     Total Intake(mL/kg) 2010 (25.3) 1880 (23.7)    Urine (mL/kg/hr) 700 (0.4) 300 (0.2)    Stool 0     Total Output 700 300    Net +1310 +1580          Urine Occurrence  2 x    Stool Occurrence 1 x           Physical Exam   Constitutional: He is oriented to person, place, and time. He appears well-developed and well-nourished.   Cardiovascular: Normal rate and regular rhythm.   Pulmonary/Chest: Effort normal. No respiratory distress.   Abdominal: Soft. He exhibits distension (mild). There is no tenderness.   Neurological: He is alert and oriented to person, place, and time.   Psychiatric: He has a normal mood and affect.   Vitals reviewed.      Significant Labs:  Pending    Significant Diagnostics:  None    Assessment/Plan:     Duodenal obstruction   Faye Jordan is a 48 y.o. male with PMHx of squamous cell lung cancer s/p pneumonectomy in 12/13/16, chemotherapy, and XRT, and atrial fibrillation who was transferred from [facility] for higher level of care. His squamous cell lung cancer has biopsy-proven  metastases to his liver and he has also developed a mass of the tail of his pancreas that is causing a duodenal obstruction. S/p duodenal stent placement on 7/22    - Full liquid diet per GI  - Pain meds prn, antiemetics prn  - Daily labs    Plan to d/c home today; Will need to arrange transportation with social work          Pb Bose MD  General Surgery  [facility]